# Patient Record
Sex: MALE | Race: BLACK OR AFRICAN AMERICAN | Employment: FULL TIME | URBAN - METROPOLITAN AREA
[De-identification: names, ages, dates, MRNs, and addresses within clinical notes are randomized per-mention and may not be internally consistent; named-entity substitution may affect disease eponyms.]

---

## 2020-03-06 ENCOUNTER — OFFICE VISIT (OUTPATIENT)
Dept: FAMILY MEDICINE CLINIC | Facility: CLINIC | Age: 65
End: 2020-03-06

## 2020-03-06 ENCOUNTER — LAB ENCOUNTER (OUTPATIENT)
Dept: LAB | Facility: REFERENCE LAB | Age: 65
End: 2020-03-06
Attending: FAMILY MEDICINE
Payer: COMMERCIAL

## 2020-03-06 VITALS
HEART RATE: 90 BPM | SYSTOLIC BLOOD PRESSURE: 128 MMHG | OXYGEN SATURATION: 98 % | HEIGHT: 75 IN | DIASTOLIC BLOOD PRESSURE: 78 MMHG | TEMPERATURE: 98 F | BODY MASS INDEX: 31.71 KG/M2 | WEIGHT: 255 LBS

## 2020-03-06 DIAGNOSIS — E11.9 TYPE 2 DIABETES MELLITUS WITHOUT COMPLICATION, WITHOUT LONG-TERM CURRENT USE OF INSULIN (HCC): ICD-10-CM

## 2020-03-06 DIAGNOSIS — I10 ESSENTIAL HYPERTENSION: Primary | ICD-10-CM

## 2020-03-06 DIAGNOSIS — E78.5 DYSLIPIDEMIA: ICD-10-CM

## 2020-03-06 DIAGNOSIS — Z12.5 PROSTATE CANCER SCREENING: ICD-10-CM

## 2020-03-06 DIAGNOSIS — I51.9 DIASTOLIC DYSFUNCTION, LEFT VENTRICLE: ICD-10-CM

## 2020-03-06 DIAGNOSIS — I10 ESSENTIAL HYPERTENSION: ICD-10-CM

## 2020-03-06 DIAGNOSIS — C91.91 LYMPHOID LEUKEMIA IN REMISSION, UNSPECIFIED LYMPHOID LEUKEMIA TYPE (HCC): ICD-10-CM

## 2020-03-06 DIAGNOSIS — Z72.820 SLEEP DEPRIVATION: ICD-10-CM

## 2020-03-06 LAB
ALBUMIN SERPL-MCNC: 3.9 G/DL (ref 3.4–5)
ALBUMIN/GLOB SERPL: 1 {RATIO} (ref 1–2)
ALP LIVER SERPL-CCNC: 68 U/L (ref 45–117)
ALT SERPL-CCNC: 26 U/L (ref 16–61)
ANION GAP SERPL CALC-SCNC: 7 MMOL/L (ref 0–18)
AST SERPL-CCNC: 15 U/L (ref 15–37)
BILIRUB SERPL-MCNC: 0.4 MG/DL (ref 0.1–2)
BILIRUB UR QL: NEGATIVE
BUN BLD-MCNC: 17 MG/DL (ref 7–18)
BUN/CREAT SERPL: 17.7 (ref 10–20)
CALCIUM BLD-MCNC: 9.4 MG/DL (ref 8.5–10.1)
CHLORIDE SERPL-SCNC: 105 MMOL/L (ref 98–112)
CHOLEST SMN-MCNC: 145 MG/DL (ref ?–200)
CLARITY UR: CLEAR
CO2 SERPL-SCNC: 29 MMOL/L (ref 21–32)
COLOR UR: YELLOW
COMPLEXED PSA SERPL-MCNC: 0.16 NG/ML (ref ?–4)
CREAT BLD-MCNC: 0.96 MG/DL (ref 0.7–1.3)
CREAT UR-SCNC: 140 MG/DL
EST. AVERAGE GLUCOSE BLD GHB EST-MCNC: 166 MG/DL (ref 68–126)
GLOBULIN PLAS-MCNC: 4 G/DL (ref 2.8–4.4)
GLUCOSE BLD-MCNC: 92 MG/DL (ref 70–99)
GLUCOSE UR-MCNC: NEGATIVE MG/DL
HBA1C MFR BLD HPLC: 7.4 % (ref ?–5.7)
HDLC SERPL-MCNC: 52 MG/DL (ref 40–59)
HGB UR QL STRIP.AUTO: NEGATIVE
KETONES UR-MCNC: NEGATIVE MG/DL
LDLC SERPL CALC-MCNC: 78 MG/DL (ref ?–100)
LEUKOCYTE ESTERASE UR QL STRIP.AUTO: NEGATIVE
M PROTEIN MFR SERPL ELPH: 7.9 G/DL (ref 6.4–8.2)
MICROALBUMIN UR-MCNC: 0.76 MG/DL
MICROALBUMIN/CREAT 24H UR-RTO: 5.4 UG/MG (ref ?–30)
NITRITE UR QL STRIP.AUTO: NEGATIVE
NONHDLC SERPL-MCNC: 93 MG/DL (ref ?–130)
OSMOLALITY SERPL CALC.SUM OF ELEC: 293 MOSM/KG (ref 275–295)
PATIENT FASTING Y/N/NP: YES
PATIENT FASTING Y/N/NP: YES
PH UR: 6 [PH] (ref 5–8)
POTASSIUM SERPL-SCNC: 4 MMOL/L (ref 3.5–5.1)
PROT UR-MCNC: NEGATIVE MG/DL
SODIUM SERPL-SCNC: 141 MMOL/L (ref 136–145)
SP GR UR STRIP: 1.02 (ref 1–1.03)
TRIGL SERPL-MCNC: 74 MG/DL (ref 30–149)
UROBILINOGEN UR STRIP-ACNC: <2
VLDLC SERPL CALC-MCNC: 15 MG/DL (ref 0–30)

## 2020-03-06 PROCEDURE — 82043 UR ALBUMIN QUANTITATIVE: CPT

## 2020-03-06 PROCEDURE — 83036 HEMOGLOBIN GLYCOSYLATED A1C: CPT

## 2020-03-06 PROCEDURE — 36415 COLL VENOUS BLD VENIPUNCTURE: CPT

## 2020-03-06 PROCEDURE — 99387 INIT PM E/M NEW PAT 65+ YRS: CPT | Performed by: FAMILY MEDICINE

## 2020-03-06 PROCEDURE — 85060 BLOOD SMEAR INTERPRETATION: CPT

## 2020-03-06 PROCEDURE — 80061 LIPID PANEL: CPT

## 2020-03-06 PROCEDURE — 80053 COMPREHEN METABOLIC PANEL: CPT

## 2020-03-06 PROCEDURE — 85025 COMPLETE CBC W/AUTO DIFF WBC: CPT

## 2020-03-06 PROCEDURE — 82306 VITAMIN D 25 HYDROXY: CPT

## 2020-03-06 PROCEDURE — 82570 ASSAY OF URINE CREATININE: CPT

## 2020-03-06 PROCEDURE — 81003 URINALYSIS AUTO W/O SCOPE: CPT

## 2020-03-06 RX ORDER — ASPIRIN 81 MG/1
81 TABLET, CHEWABLE ORAL DAILY
COMMUNITY

## 2020-03-06 RX ORDER — LISINOPRIL 20 MG/1
20 TABLET ORAL DAILY
COMMUNITY
End: 2020-04-14

## 2020-03-06 RX ORDER — SIMVASTATIN 40 MG
40 TABLET ORAL NIGHTLY
COMMUNITY
End: 2020-04-14

## 2020-03-06 RX ORDER — ERGOCALCIFEROL 1.25 MG/1
50000 CAPSULE ORAL WEEKLY
COMMUNITY
End: 2020-04-17

## 2020-03-06 NOTE — PATIENT INSTRUCTIONS
Start to take sugar-free Metamucil, 1 teaspoon once daily with 12 to 16 ounces of water. Get exercise every day. This is very important with your diagnosis of hypertension and diabetes. Please start very slowly, about 5 minutes daily.   Then you can in

## 2020-03-06 NOTE — PROGRESS NOTES
HPI:    Patient ID: Nataliia Blair is a 72year old male who presents for:  Epigastric hernia repair  DM , last blod check Jan 2019 diagnosed at 39yo  Hypertension diagnosed at 40 yo  Right left back radiculopathy  Diagnosed with leukemia in remission.   Pt is w organizations: Not on file        Relationship status: Not on file      Intimate partner violence:        Fear of current or ex partner: Not on file        Emotionally abused: Not on file        Physically abused: Not on file        Forced sexual activity: Nonpainful, non-ballotable  Heart: PMI nondisplaced, regular rate and rhythm without murmur  Lungs clear to auscultation  Abdomen soft without visceromegaly, masses, tenderness  External genitalia: Penis uncircumcised, testes bilaterally descended without [facility])  Need to get medical records. Patient brought CBC that revealed a lymphocytosis. No bone marrow results seen  - CBC WITH DIFFERENTIAL WITH PLATELET; Future    6.  Diastolic dysfunction, left ventricle  Consider adding beta-blocker if patient needs a

## 2020-03-07 LAB
BASOPHILS # BLD AUTO: 0.03 X10(3) UL (ref 0–0.2)
BASOPHILS NFR BLD AUTO: 0.3 %
DEPRECATED RDW RBC AUTO: 47.3 FL (ref 35.1–46.3)
EOSINOPHIL # BLD AUTO: 0.08 X10(3) UL (ref 0–0.7)
EOSINOPHIL NFR BLD AUTO: 0.8 %
ERYTHROCYTE [DISTWIDTH] IN BLOOD BY AUTOMATED COUNT: 14 % (ref 11–15)
HCT VFR BLD AUTO: 41.5 % (ref 39–53)
HGB BLD-MCNC: 13.8 G/DL (ref 13–17.5)
IMM GRANULOCYTES # BLD AUTO: 0.02 X10(3) UL (ref 0–1)
IMM GRANULOCYTES NFR BLD: 0.2 %
LYMPHOCYTES # BLD AUTO: 6.84 X10(3) UL (ref 1–4)
LYMPHOCYTES NFR BLD AUTO: 65.5 %
MCH RBC QN AUTO: 30.5 PG (ref 26–34)
MCHC RBC AUTO-ENTMCNC: 33.3 G/DL (ref 31–37)
MCV RBC AUTO: 91.8 FL (ref 80–100)
MONOCYTES # BLD AUTO: 0.67 X10(3) UL (ref 0.1–1)
MONOCYTES NFR BLD AUTO: 6.4 %
NEUTROPHILS # BLD AUTO: 2.8 X10 (3) UL (ref 1.5–7.7)
NEUTROPHILS # BLD AUTO: 2.8 X10(3) UL (ref 1.5–7.7)
NEUTROPHILS NFR BLD AUTO: 26.8 %
PLATELET # BLD AUTO: 184 10(3)UL (ref 150–450)
RBC # BLD AUTO: 4.52 X10(6)UL (ref 3.8–5.8)
WBC # BLD AUTO: 10.4 X10(3) UL (ref 4–11)

## 2020-03-09 LAB — 25(OH)D3 SERPL-MCNC: 51.2 NG/ML (ref 30–100)

## 2020-03-15 ENCOUNTER — TELEPHONE (OUTPATIENT)
Dept: FAMILY MEDICINE CLINIC | Facility: CLINIC | Age: 65
End: 2020-03-15

## 2020-03-15 DIAGNOSIS — E11.9 TYPE 2 DIABETES MELLITUS WITHOUT COMPLICATION, WITHOUT LONG-TERM CURRENT USE OF INSULIN (HCC): Primary | ICD-10-CM

## 2020-03-15 NOTE — TELEPHONE ENCOUNTER
Phone call with patient  A1c is 7.4  Lipid, hitesh, vitamin D, urine for microalbumin, and PSA all normal.  We will increase metformin from 500 mg to 1000 mg daily. Will repeat blood in 6 months.   Patient expressed understanding  [address]

## 2020-04-14 RX ORDER — SIMVASTATIN 40 MG
40 TABLET ORAL NIGHTLY
Qty: 90 TABLET | Refills: 0 | Status: SHIPPED | OUTPATIENT
Start: 2020-04-14 | End: 2020-07-07

## 2020-04-14 RX ORDER — LISINOPRIL 20 MG/1
20 TABLET ORAL DAILY
Qty: 90 TABLET | Refills: 0 | Status: SHIPPED | OUTPATIENT
Start: 2020-04-14 | End: 2020-07-13

## 2020-04-14 NOTE — TELEPHONE ENCOUNTER
Pt is needing refills on     lisinopril 20 MG Oral Tab    simvastatin 40 MG Oral Tab      [id number] [place], [place] - [address] AT [facility], [phone], [phone]

## 2020-04-14 NOTE — TELEPHONE ENCOUNTER
Pt's wife notified that meds were refilled as requested. wife verbalized understanding and agrees with POC.

## 2020-04-17 ENCOUNTER — TELEPHONE (OUTPATIENT)
Dept: FAMILY MEDICINE CLINIC | Facility: CLINIC | Age: 65
End: 2020-04-17

## 2020-04-17 RX ORDER — ERGOCALCIFEROL 1.25 MG/1
50000 CAPSULE ORAL WEEKLY
Qty: 12 CAPSULE | Refills: 3 | Status: SHIPPED | OUTPATIENT
Start: 2020-04-17 | End: 2021-04-06

## 2020-04-17 NOTE — TELEPHONE ENCOUNTER
A refill request was received for:  Requested Prescriptions      No prescriptions requested or ordered in this encounter     Last refill date: unknown  Qty:   Last office visit: 03/06/2020  When is follow up due: 04/20/2020    Future Appointments   Date Ti

## 2020-04-20 ENCOUNTER — TELEMEDICINE (OUTPATIENT)
Dept: FAMILY MEDICINE CLINIC | Facility: CLINIC | Age: 65
End: 2020-04-20

## 2020-04-20 DIAGNOSIS — E78.5 DYSLIPIDEMIA: ICD-10-CM

## 2020-04-20 DIAGNOSIS — Z72.820 SLEEP DEPRIVATION: ICD-10-CM

## 2020-04-20 DIAGNOSIS — E11.9 TYPE 2 DIABETES MELLITUS WITHOUT COMPLICATION, WITHOUT LONG-TERM CURRENT USE OF INSULIN (HCC): Primary | ICD-10-CM

## 2020-04-20 DIAGNOSIS — I10 ESSENTIAL HYPERTENSION: ICD-10-CM

## 2020-04-20 PROCEDURE — 99213 OFFICE O/P EST LOW 20 MIN: CPT | Performed by: FAMILY MEDICINE

## 2020-04-20 NOTE — PROGRESS NOTES
This is a telemedicine visit with live, interactive video and audio. Patient understands and accepts financial responsibility for any deductible, co-insurance and/or co-pays associated with this service.     SUBJECTIVE  Follow-up of diabetes, hypertensi labored, patient without conversational dyspnea.   No cough,    ASSESSMENT & PLAN  Diagnoses and all orders for this visit:    Type 2 diabetes mellitus without complication, without long-term current use of insulin ([facility])  -     OPHTHALMOLOGY - INTERNAL    D

## 2020-07-07 RX ORDER — SIMVASTATIN 40 MG
TABLET ORAL
Qty: 90 TABLET | Refills: 0 | Status: SHIPPED | OUTPATIENT
Start: 2020-07-07 | End: 2020-09-29

## 2020-07-13 RX ORDER — LISINOPRIL 20 MG/1
TABLET ORAL
Qty: 90 TABLET | Refills: 0 | Status: SHIPPED | OUTPATIENT
Start: 2020-07-13 | End: 2020-11-17

## 2020-08-27 ENCOUNTER — PATIENT MESSAGE (OUTPATIENT)
Dept: FAMILY MEDICINE CLINIC | Facility: CLINIC | Age: 65
End: 2020-08-27

## 2020-08-27 NOTE — TELEPHONE ENCOUNTER
Called pt and schedule appointment with Dr. Sonali Newton at 0800 on 09/18/20. Pt verbalized understanding. Last visit 04/20/20 [facility]   Return in about 3 months (around 7/20/2020).

## 2020-08-27 NOTE — TELEPHONE ENCOUNTER
From: Juan Godinez  To: Ronal Lewis DO  Sent: 8/27/2020 9:59 AM CDT  Subject: Visit Follow-up Question    I need to schedule a test for blood in the stool which is over due.

## 2020-09-18 ENCOUNTER — LAB ENCOUNTER (OUTPATIENT)
Dept: LAB | Facility: REFERENCE LAB | Age: 65
End: 2020-09-18
Attending: FAMILY MEDICINE
Payer: COMMERCIAL

## 2020-09-18 ENCOUNTER — OFFICE VISIT (OUTPATIENT)
Dept: FAMILY MEDICINE CLINIC | Facility: CLINIC | Age: 65
End: 2020-09-18

## 2020-09-18 VITALS
DIASTOLIC BLOOD PRESSURE: 76 MMHG | TEMPERATURE: 98 F | HEART RATE: 94 BPM | BODY MASS INDEX: 31.83 KG/M2 | HEIGHT: 75 IN | OXYGEN SATURATION: 99 % | SYSTOLIC BLOOD PRESSURE: 138 MMHG | WEIGHT: 256 LBS

## 2020-09-18 DIAGNOSIS — Z23 NEED FOR VACCINATION: ICD-10-CM

## 2020-09-18 DIAGNOSIS — I10 ESSENTIAL HYPERTENSION: Primary | ICD-10-CM

## 2020-09-18 DIAGNOSIS — I10 ESSENTIAL HYPERTENSION: ICD-10-CM

## 2020-09-18 DIAGNOSIS — R94.31 ABNORMAL EKG: ICD-10-CM

## 2020-09-18 DIAGNOSIS — E11.9 TYPE 2 DIABETES MELLITUS WITHOUT COMPLICATION, WITHOUT LONG-TERM CURRENT USE OF INSULIN (HCC): ICD-10-CM

## 2020-09-18 DIAGNOSIS — N52.9 ERECTILE DYSFUNCTION, UNSPECIFIED ERECTILE DYSFUNCTION TYPE: ICD-10-CM

## 2020-09-18 LAB
ALBUMIN SERPL-MCNC: 3.5 G/DL (ref 3.4–5)
ALBUMIN/GLOB SERPL: 0.8 {RATIO} (ref 1–2)
ALP LIVER SERPL-CCNC: 70 U/L (ref 45–117)
ALT SERPL-CCNC: 24 U/L (ref 16–61)
ANION GAP SERPL CALC-SCNC: 3 MMOL/L (ref 0–18)
AST SERPL-CCNC: 20 U/L (ref 15–37)
BILIRUB SERPL-MCNC: 0.3 MG/DL (ref 0.1–2)
BUN BLD-MCNC: 14 MG/DL (ref 7–18)
BUN/CREAT SERPL: 13.6 (ref 10–20)
CALCIUM BLD-MCNC: 8.9 MG/DL (ref 8.5–10.1)
CHLORIDE SERPL-SCNC: 107 MMOL/L (ref 98–112)
CHOLEST SMN-MCNC: 152 MG/DL (ref ?–200)
CO2 SERPL-SCNC: 28 MMOL/L (ref 21–32)
CREAT BLD-MCNC: 1.03 MG/DL (ref 0.7–1.3)
EST. AVERAGE GLUCOSE BLD GHB EST-MCNC: 160 MG/DL (ref 68–126)
GLOBULIN PLAS-MCNC: 4.2 G/DL (ref 2.8–4.4)
GLUCOSE BLD-MCNC: 116 MG/DL (ref 70–99)
HBA1C MFR BLD HPLC: 7.2 % (ref ?–5.7)
HDLC SERPL-MCNC: 56 MG/DL (ref 40–59)
LDLC SERPL CALC-MCNC: 74 MG/DL (ref ?–100)
M PROTEIN MFR SERPL ELPH: 7.7 G/DL (ref 6.4–8.2)
NONHDLC SERPL-MCNC: 96 MG/DL (ref ?–130)
OSMOLALITY SERPL CALC.SUM OF ELEC: 287 MOSM/KG (ref 275–295)
PATIENT FASTING Y/N/NP: YES
PATIENT FASTING Y/N/NP: YES
POTASSIUM SERPL-SCNC: 3.8 MMOL/L (ref 3.5–5.1)
SODIUM SERPL-SCNC: 138 MMOL/L (ref 136–145)
TRIGL SERPL-MCNC: 109 MG/DL (ref 30–149)
VLDLC SERPL CALC-MCNC: 22 MG/DL (ref 0–30)

## 2020-09-18 PROCEDURE — 93000 ELECTROCARDIOGRAM COMPLETE: CPT | Performed by: FAMILY MEDICINE

## 2020-09-18 PROCEDURE — 3008F BODY MASS INDEX DOCD: CPT | Performed by: FAMILY MEDICINE

## 2020-09-18 PROCEDURE — 80053 COMPREHEN METABOLIC PANEL: CPT

## 2020-09-18 PROCEDURE — 3075F SYST BP GE 130 - 139MM HG: CPT | Performed by: FAMILY MEDICINE

## 2020-09-18 PROCEDURE — 99214 OFFICE O/P EST MOD 30 MIN: CPT | Performed by: FAMILY MEDICINE

## 2020-09-18 PROCEDURE — 36415 COLL VENOUS BLD VENIPUNCTURE: CPT

## 2020-09-18 PROCEDURE — 3078F DIAST BP <80 MM HG: CPT | Performed by: FAMILY MEDICINE

## 2020-09-18 PROCEDURE — 80061 LIPID PANEL: CPT

## 2020-09-18 PROCEDURE — 90471 IMMUNIZATION ADMIN: CPT | Performed by: FAMILY MEDICINE

## 2020-09-18 PROCEDURE — 83036 HEMOGLOBIN GLYCOSYLATED A1C: CPT

## 2020-09-18 PROCEDURE — 90662 IIV NO PRSV INCREASED AG IM: CPT | Performed by: FAMILY MEDICINE

## 2020-09-18 RX ORDER — TADALAFIL 5 MG/1
5 TABLET ORAL
Qty: 18 TABLET | Refills: 3 | Status: SHIPPED | OUTPATIENT
Start: 2020-09-18 | End: 2020-12-07

## 2020-09-18 NOTE — PROGRESS NOTES
HPI:    Patient ID: Cj Moreno is a 72year old male who presents for His one complaint is that his ability to have a meaningful erection has worsened  He denies chest pain, chest tightness, shortness of breath, diaphoresis with exertion.   He is a [employer] violence:        Fear of current or ex partner: Not on file        Emotionally abused: Not on file        Physically abused: Not on file        Forced sexual activity: Not on file    Other Topics      Concerns:        Caffeine Concern: Not Asked        Exe anxiety  Hematology/Lymphatics: Negative for: bruising, lower extremity edema  Endocrine: Negative for: polyuria, polydypsia  Skin: Negative for: rash, blister,          /76   Pulse 94   Temp 98 °F (36.7 °C) (Oral)   Ht 75\"   Wt 256 lb (116.1 kg) total) by mouth daily as needed for Erectile Dysfunction. Dispense: 18 tablet;  Refill: 3       Meds This Visit:  Requested Prescriptions     Signed Prescriptions Disp Refills   • tadalafil 5 MG Oral Tab 18 tablet 3     Sig: Take 1 tablet (5 mg total) by m

## 2020-09-23 ENCOUNTER — TELEPHONE (OUTPATIENT)
Dept: FAMILY MEDICINE CLINIC | Facility: CLINIC | Age: 65
End: 2020-09-23

## 2020-09-29 RX ORDER — SIMVASTATIN 40 MG
TABLET ORAL
Qty: 90 TABLET | Refills: 0 | Status: SHIPPED | OUTPATIENT
Start: 2020-09-29 | End: 2020-12-30

## 2020-09-29 NOTE — TELEPHONE ENCOUNTER
A refill request was received for:  Requested Prescriptions     Pending Prescriptions Disp Refills   • SIMVASTATIN 40 MG Oral Tab [Pharmacy Med Name: SIMVASTATIN 40 MG TABLET] 90 tablet 0     Sig: TAKE 1 TABLET BY MOUTH EVERY DAY AT NIGHT     Last refill d

## 2020-10-20 ENCOUNTER — TELEPHONE (OUTPATIENT)
Dept: CASE MANAGEMENT | Age: 65
End: 2020-10-20

## 2020-10-20 NOTE — TELEPHONE ENCOUNTER
Patient informed is due for DM eye exam, states has appointment for today and will have them forward the report to Dr. Kimberlee Camacho office.

## 2020-10-22 ENCOUNTER — TELEPHONE (OUTPATIENT)
Dept: FAMILY MEDICINE CLINIC | Facility: CLINIC | Age: 65
End: 2020-10-22

## 2020-11-17 RX ORDER — LISINOPRIL 20 MG/1
TABLET ORAL
Qty: 90 TABLET | Refills: 0 | Status: SHIPPED | OUTPATIENT
Start: 2020-11-17 | End: 2021-02-08

## 2020-12-07 RX ORDER — TADALAFIL 10 MG/1
10 TABLET ORAL
Qty: 18 TABLET | Refills: 3 | Status: SHIPPED | OUTPATIENT
Start: 2020-12-07

## 2020-12-08 ENCOUNTER — TELEPHONE (OUTPATIENT)
Dept: FAMILY MEDICINE CLINIC | Facility: CLINIC | Age: 65
End: 2020-12-08

## 2020-12-30 RX ORDER — SIMVASTATIN 40 MG
TABLET ORAL
Qty: 90 TABLET | Refills: 0 | Status: SHIPPED | OUTPATIENT
Start: 2020-12-30 | End: 2021-05-06

## 2021-01-23 ENCOUNTER — HOSPITAL ENCOUNTER (EMERGENCY)
Facility: HOSPITAL | Age: 66
Discharge: HOME | End: 2021-01-23
Attending: EMERGENCY MEDICINE
Payer: COMMERCIAL

## 2021-01-23 ENCOUNTER — APPOINTMENT (OUTPATIENT)
Dept: GENERAL RADIOLOGY | Facility: HOSPITAL | Age: 66
End: 2021-01-23
Attending: EMERGENCY MEDICINE
Payer: COMMERCIAL

## 2021-01-23 VITALS
TEMPERATURE: 98 F | HEIGHT: 75 IN | OXYGEN SATURATION: 95 % | DIASTOLIC BLOOD PRESSURE: 70 MMHG | WEIGHT: 255 LBS | HEART RATE: 88 BPM | SYSTOLIC BLOOD PRESSURE: 124 MMHG | BODY MASS INDEX: 31.71 KG/M2 | RESPIRATION RATE: 22 BRPM

## 2021-01-23 DIAGNOSIS — R07.9 CHEST PAIN OF UNCERTAIN ETIOLOGY: Primary | ICD-10-CM

## 2021-01-23 LAB
ALBUMIN SERPL-MCNC: 3.7 G/DL (ref 3.4–5)
ALP LIVER SERPL-CCNC: 72 U/L
ALT SERPL-CCNC: 21 U/L
ANION GAP SERPL CALC-SCNC: 4 MMOL/L (ref 0–18)
AST SERPL-CCNC: 13 U/L (ref 15–37)
BASOPHILS # BLD AUTO: 0.02 X10(3) UL (ref 0–0.2)
BASOPHILS NFR BLD AUTO: 0.2 %
BILIRUB DIRECT SERPL-MCNC: 0.1 MG/DL (ref 0–0.2)
BILIRUB SERPL-MCNC: 0.3 MG/DL (ref 0.1–2)
BUN BLD-MCNC: 15 MG/DL (ref 7–18)
BUN/CREAT SERPL: 14.4 (ref 10–20)
CALCIUM BLD-MCNC: 8.9 MG/DL (ref 8.5–10.1)
CHLORIDE SERPL-SCNC: 108 MMOL/L (ref 98–112)
CO2 SERPL-SCNC: 27 MMOL/L (ref 21–32)
CREAT BLD-MCNC: 1.04 MG/DL
D DIMER PPP FEU-MCNC: 0.28 UG/ML FEU (ref ?–0.65)
DEPRECATED RDW RBC AUTO: 47.1 FL (ref 35.1–46.3)
EOSINOPHIL # BLD AUTO: 0.04 X10(3) UL (ref 0–0.7)
EOSINOPHIL NFR BLD AUTO: 0.4 %
ERYTHROCYTE [DISTWIDTH] IN BLOOD BY AUTOMATED COUNT: 14.4 % (ref 11–15)
GLUCOSE BLD-MCNC: 119 MG/DL (ref 70–99)
HCT VFR BLD AUTO: 38.4 %
HGB BLD-MCNC: 13.1 G/DL
IMM GRANULOCYTES # BLD AUTO: 0.01 X10(3) UL (ref 0–1)
IMM GRANULOCYTES NFR BLD: 0.1 %
LIPASE SERPL-CCNC: 119 U/L (ref 73–393)
LYMPHOCYTES # BLD AUTO: 4.31 X10(3) UL (ref 1–4)
LYMPHOCYTES NFR BLD AUTO: 45.4 %
M PROTEIN MFR SERPL ELPH: 7.6 G/DL (ref 6.4–8.2)
MCH RBC QN AUTO: 30.8 PG (ref 26–34)
MCHC RBC AUTO-ENTMCNC: 34.1 G/DL (ref 31–37)
MCV RBC AUTO: 90.4 FL
MONOCYTES # BLD AUTO: 0.56 X10(3) UL (ref 0.1–1)
MONOCYTES NFR BLD AUTO: 5.9 %
NEUTROPHILS # BLD AUTO: 4.56 X10 (3) UL (ref 1.5–7.7)
NEUTROPHILS # BLD AUTO: 4.56 X10(3) UL (ref 1.5–7.7)
NEUTROPHILS NFR BLD AUTO: 48 %
OSMOLALITY SERPL CALC.SUM OF ELEC: 290 MOSM/KG (ref 275–295)
PLATELET # BLD AUTO: 171 10(3)UL (ref 150–450)
POTASSIUM SERPL-SCNC: 3.8 MMOL/L (ref 3.5–5.1)
RBC # BLD AUTO: 4.25 X10(6)UL
SODIUM SERPL-SCNC: 139 MMOL/L (ref 136–145)
TROPONIN I SERPL-MCNC: <0.045 NG/ML (ref ?–0.04)
WBC # BLD AUTO: 9.5 X10(3) UL (ref 4–11)

## 2021-01-23 PROCEDURE — 85379 FIBRIN DEGRADATION QUANT: CPT | Performed by: EMERGENCY MEDICINE

## 2021-01-23 PROCEDURE — 93005 ELECTROCARDIOGRAM TRACING: CPT

## 2021-01-23 PROCEDURE — 84484 ASSAY OF TROPONIN QUANT: CPT | Performed by: EMERGENCY MEDICINE

## 2021-01-23 PROCEDURE — 80076 HEPATIC FUNCTION PANEL: CPT | Performed by: EMERGENCY MEDICINE

## 2021-01-23 PROCEDURE — 96374 THER/PROPH/DIAG INJ IV PUSH: CPT

## 2021-01-23 PROCEDURE — 80048 BASIC METABOLIC PNL TOTAL CA: CPT | Performed by: EMERGENCY MEDICINE

## 2021-01-23 PROCEDURE — 83690 ASSAY OF LIPASE: CPT | Performed by: EMERGENCY MEDICINE

## 2021-01-23 PROCEDURE — 93010 ELECTROCARDIOGRAM REPORT: CPT | Performed by: EMERGENCY MEDICINE

## 2021-01-23 PROCEDURE — 99284 EMERGENCY DEPT VISIT MOD MDM: CPT

## 2021-01-23 PROCEDURE — 71045 X-RAY EXAM CHEST 1 VIEW: CPT | Performed by: EMERGENCY MEDICINE

## 2021-01-23 PROCEDURE — 85025 COMPLETE CBC W/AUTO DIFF WBC: CPT | Performed by: EMERGENCY MEDICINE

## 2021-01-23 RX ORDER — METOPROLOL SUCCINATE 25 MG/1
25 TABLET, EXTENDED RELEASE ORAL DAILY
Qty: 14 TABLET | Refills: 0 | Status: SHIPPED | OUTPATIENT
Start: 2021-01-23 | End: 2021-02-06

## 2021-01-23 RX ORDER — METOPROLOL TARTRATE 5 MG/5ML
5 INJECTION INTRAVENOUS ONCE
Status: COMPLETED | OUTPATIENT
Start: 2021-01-23 | End: 2021-01-23

## 2021-01-23 RX ORDER — ASPIRIN 81 MG/1
162 TABLET, CHEWABLE ORAL ONCE
Status: COMPLETED | OUTPATIENT
Start: 2021-01-23 | End: 2021-01-23

## 2021-01-23 NOTE — ED PROVIDER NOTES
Patient Seen in: [facility] AND [facility] Department    History   Patient presents with:  Chest Pain Angina    Stated Complaint: chest pain x 3 days    HPI    72year old male presenting with chest pain. Pain began 3 days ago .  The patient describes t Relation Age of Onset   • Cancer Father    • Genetic Disease Mother    • Cancer Maternal Grandmother        Social History    Tobacco Use      Smoking status: Former Smoker        Packs/day: 0.75        Years: 20.00        Pack years: 13        Quit date: diagnosis to include Acute coronary syndrome vs. Acute pulmonary process including pneumothorax vs. Dissection vs.  pleurisy vs. PE vs. Pneumonia/effusion vs. GI related pathology such as GERD or gastritis, vs. Musculoskeletal        ED Course     Labs Rev (>65 2pt, 45-64 1 pt, Under 45 0 pt)    2  Risk Factors- ( DM,HTN,Chol, fhx CAD, BMI>30, hx CAD)  ( >3 or hx CAD 2pt, 1-2 risks 1pt, None 0pt)  2  Troponin- ( 3 times nl 2pt, 2 times nl 1pt, nl 0pt   0         TOTAL  5    SCORE 0-3: 2.5% MACE over next 6 w Prescribed:  Discharge Medication List as of 1/23/2021  4:01 PM    START taking these medications    Metoprolol Succinate ER 25 MG Oral Tablet 24 Hr  Take 1 tablet (25 mg total) by mouth daily for 14 days. , Normal, Disp-14 tablet, R-0

## 2021-01-25 NOTE — CM/SW NOTE
Kisha Zhao called back and spoke with patient and was able to schedule patient for [facility] tomorrow 1/26 @ 3349.

## 2021-01-25 NOTE — CM/SW NOTE
Milas Beams from [employer] called this RN - per Nina Beams he sees the order is in [facility] and he will call patient to schedule. Milas Beams will call ERCM back with date & time he was able to get patient scheduled.

## 2021-01-25 NOTE — CM/SW NOTE
Jw Friedman called ERCM back and states patient called him back and wanted to change his Priya Sanchez date from tomorrow to [facility] 1/27 at 0830 - so patient now coming on 1/27 @ 0830 for Priya Sanchez per Jw Friedman.

## 2021-01-27 ENCOUNTER — HOSPITAL ENCOUNTER (OUTPATIENT)
Dept: CV DIAGNOSTICS | Facility: HOSPITAL | Age: 66
Discharge: HOME | End: 2021-01-27
Attending: EMERGENCY MEDICINE
Payer: COMMERCIAL

## 2021-01-27 ENCOUNTER — HOSPITAL ENCOUNTER (OUTPATIENT)
Dept: NUCLEAR MEDICINE | Facility: HOSPITAL | Age: 66
Discharge: HOME | End: 2021-01-27
Attending: EMERGENCY MEDICINE
Payer: COMMERCIAL

## 2021-01-27 DIAGNOSIS — R07.9 CHEST PAIN OF UNCERTAIN ETIOLOGY: ICD-10-CM

## 2021-01-27 PROCEDURE — 93016 CV STRESS TEST SUPVJ ONLY: CPT | Performed by: EMERGENCY MEDICINE

## 2021-01-27 PROCEDURE — 93018 CV STRESS TEST I&R ONLY: CPT | Performed by: EMERGENCY MEDICINE

## 2021-01-27 PROCEDURE — 93017 CV STRESS TEST TRACING ONLY: CPT | Performed by: EMERGENCY MEDICINE

## 2021-01-27 PROCEDURE — 78452 HT MUSCLE IMAGE SPECT MULT: CPT | Performed by: EMERGENCY MEDICINE

## 2021-01-29 DIAGNOSIS — Z23 NEED FOR VACCINATION: ICD-10-CM

## 2021-02-05 ENCOUNTER — IMMUNIZATION (OUTPATIENT)
Dept: LAB | Facility: HOSPITAL | Age: 66
End: 2021-02-05
Attending: HOSPITALIST
Payer: COMMERCIAL

## 2021-02-05 DIAGNOSIS — Z23 NEED FOR VACCINATION: Primary | ICD-10-CM

## 2021-02-05 PROCEDURE — 0011A SARSCOV2 VAC 100MCG/0.5ML IM: CPT

## 2021-02-08 ENCOUNTER — TELEPHONE (OUTPATIENT)
Dept: FAMILY MEDICINE CLINIC | Facility: CLINIC | Age: 66
End: 2021-02-08

## 2021-02-08 RX ORDER — LISINOPRIL 20 MG/1
20 TABLET ORAL DAILY
Qty: 30 TABLET | Refills: 0 | Status: SHIPPED | OUTPATIENT
Start: 2021-02-08 | End: 2021-03-07

## 2021-02-08 NOTE — TELEPHONE ENCOUNTER
Current Outpatient Medications   Medication Sig Dispense Refill   • LISINOPRIL 20 MG Oral Tab TAKE 1 TABLET BY MOUTH EVERY DAY 90 tablet 0

## 2021-02-08 NOTE — TELEPHONE ENCOUNTER
Medication requested   • LISINOPRIL 20 MG Oral Tab TAKE 1 TABLET BY MOUTH EVERY DAY 90 tablet 0         Pt last appt/annual: 9/18/20    RTO (return to office) recommendation:3 months Dec 2020    Last refill: 11/14/20    Next appt scheduled: none       Pt r

## 2021-02-18 ENCOUNTER — OFFICE VISIT (OUTPATIENT)
Dept: FAMILY MEDICINE CLINIC | Facility: CLINIC | Age: 66
End: 2021-02-18

## 2021-02-18 VITALS
HEART RATE: 80 BPM | HEIGHT: 75 IN | BODY MASS INDEX: 31.58 KG/M2 | SYSTOLIC BLOOD PRESSURE: 158 MMHG | OXYGEN SATURATION: 98 % | WEIGHT: 254 LBS | DIASTOLIC BLOOD PRESSURE: 78 MMHG

## 2021-02-18 DIAGNOSIS — M25.512 CHRONIC PAIN OF BOTH SHOULDERS: ICD-10-CM

## 2021-02-18 DIAGNOSIS — M25.511 CHRONIC PAIN OF BOTH SHOULDERS: ICD-10-CM

## 2021-02-18 DIAGNOSIS — I10 ESSENTIAL HYPERTENSION: ICD-10-CM

## 2021-02-18 DIAGNOSIS — R07.89 ATYPICAL CHEST PAIN: Primary | ICD-10-CM

## 2021-02-18 DIAGNOSIS — G89.29 CHRONIC PAIN OF BOTH SHOULDERS: ICD-10-CM

## 2021-02-18 PROCEDURE — 3078F DIAST BP <80 MM HG: CPT | Performed by: FAMILY MEDICINE

## 2021-02-18 PROCEDURE — 3077F SYST BP >= 140 MM HG: CPT | Performed by: FAMILY MEDICINE

## 2021-02-18 PROCEDURE — 3008F BODY MASS INDEX DOCD: CPT | Performed by: FAMILY MEDICINE

## 2021-02-18 PROCEDURE — 99214 OFFICE O/P EST MOD 30 MIN: CPT | Performed by: FAMILY MEDICINE

## 2021-02-18 PROCEDURE — 1111F DSCHRG MED/CURRENT MED MERGE: CPT | Performed by: FAMILY MEDICINE

## 2021-02-18 RX ORDER — HYDROCORTISONE 25 MG/G
1 CREAM TOPICAL 2 TIMES DAILY
COMMUNITY
Start: 2021-02-06 | End: 2021-04-06

## 2021-02-18 NOTE — PROGRESS NOTES
HPI:    Patient ID: Jayson Vasquez is a 77year old male who presents for ED f/u for chest pain, and shoulder pain. HPI  Feels pretty good. Has not had any pain since ED. Works as a deacon, fine arts director, and other roles.  Coming up on tax season, a negative. /78   Pulse 80   Ht 6' 3\" (1.905 m)   Wt 254 lb (115.2 kg)   SpO2 98%   BMI 31.75 kg/m²     PHYSICAL EXAM:   Physical Exam    Constitutional: He is obese. He appears well-developed and well-nourished. No distress.    HENT:   Head: cardiac w/u at this time. 2. Essential hypertension  -Elevated today. -BP normal on 1/23/21.   -Resume home monitoring.  -Continue lisinopril 20mg daily. -RTC in 2 weeks to review BP log.   -Had sleep study many years ago and normal per patient.

## 2021-03-02 ENCOUNTER — OFFICE VISIT (OUTPATIENT)
Dept: FAMILY MEDICINE CLINIC | Facility: CLINIC | Age: 66
End: 2021-03-02

## 2021-03-02 VITALS
DIASTOLIC BLOOD PRESSURE: 78 MMHG | SYSTOLIC BLOOD PRESSURE: 134 MMHG | HEART RATE: 81 BPM | BODY MASS INDEX: 31.46 KG/M2 | HEIGHT: 75 IN | WEIGHT: 253 LBS | OXYGEN SATURATION: 98 %

## 2021-03-02 DIAGNOSIS — G89.29 CHRONIC LEFT SHOULDER PAIN: ICD-10-CM

## 2021-03-02 DIAGNOSIS — G89.29 CHRONIC PAIN OF BOTH SHOULDERS: ICD-10-CM

## 2021-03-02 DIAGNOSIS — M25.512 CHRONIC LEFT SHOULDER PAIN: ICD-10-CM

## 2021-03-02 DIAGNOSIS — M25.511 CHRONIC PAIN OF BOTH SHOULDERS: ICD-10-CM

## 2021-03-02 DIAGNOSIS — M25.512 CHRONIC PAIN OF BOTH SHOULDERS: ICD-10-CM

## 2021-03-02 DIAGNOSIS — I10 ESSENTIAL HYPERTENSION: Primary | ICD-10-CM

## 2021-03-02 PROCEDURE — 3008F BODY MASS INDEX DOCD: CPT | Performed by: FAMILY MEDICINE

## 2021-03-02 PROCEDURE — 99214 OFFICE O/P EST MOD 30 MIN: CPT | Performed by: FAMILY MEDICINE

## 2021-03-02 PROCEDURE — 3075F SYST BP GE 130 - 139MM HG: CPT | Performed by: FAMILY MEDICINE

## 2021-03-02 PROCEDURE — 20610 DRAIN/INJ JOINT/BURSA W/O US: CPT | Performed by: FAMILY MEDICINE

## 2021-03-02 PROCEDURE — 3078F DIAST BP <80 MM HG: CPT | Performed by: FAMILY MEDICINE

## 2021-03-02 RX ORDER — TRIAMCINOLONE ACETONIDE 40 MG/ML
40 INJECTION, SUSPENSION INTRA-ARTICULAR; INTRAMUSCULAR ONCE
Status: COMPLETED | OUTPATIENT
Start: 2021-03-02 | End: 2021-03-02

## 2021-03-02 RX ADMIN — TRIAMCINOLONE ACETONIDE 40 MG: 40 INJECTION, SUSPENSION INTRA-ARTICULAR; INTRAMUSCULAR at 11:36:00

## 2021-03-02 NOTE — PROGRESS NOTES
HPI:    Patient ID: Vipul Cardona is a 77year old male who presents for HTN & shoulder pain f/u. HPI  HTN:  Continues to take lisinopril 20mg daily as prescribed. Home BP usually 150-160s/50-70. Home BP cuff is old.  BP has always been in that range w and atraumatic.    Right Ear: Tympanic membrane, external ear and ear canal normal.   Left Ear: Tympanic membrane, external ear and ear canal normal.   Nose: Nose normal.   Mouth/Throat: Oropharynx is clear and moist. No oropharyngeal exudate or pharynx blayne well.    Plan:  Appropriate post injection instructions given         ASSESSMENT/PLAN:   Essential hypertension  (primary encounter diagnosis)  Chronic left shoulder pain  Chronic pain of both shoulders    1.  Essential hypertension  -Home BP cuff likely in

## 2021-03-04 ENCOUNTER — TELEPHONE (OUTPATIENT)
Dept: FAMILY MEDICINE CLINIC | Facility: CLINIC | Age: 66
End: 2021-03-04

## 2021-03-04 NOTE — TELEPHONE ENCOUNTER
LM with patient that Lilianety Budswetha has been changed to [facility]. Gave phone [phone]   Faxed referral to [phone].

## 2021-03-04 NOTE — TELEPHONE ENCOUNTER
LM to inform patient that his physical therapy referral to [facility] as been approved for 8 visits.

## 2021-03-05 ENCOUNTER — IMMUNIZATION (OUTPATIENT)
Dept: LAB | Facility: HOSPITAL | Age: 66
End: 2021-03-05
Attending: EMERGENCY MEDICINE
Payer: COMMERCIAL

## 2021-03-05 DIAGNOSIS — Z23 NEED FOR VACCINATION: Primary | ICD-10-CM

## 2021-03-05 PROCEDURE — 0012A SARSCOV2 VAC 100MCG/0.5ML IM: CPT

## 2021-03-07 RX ORDER — LISINOPRIL 20 MG/1
20 TABLET ORAL DAILY
Qty: 90 TABLET | Refills: 1 | Status: SHIPPED | OUTPATIENT
Start: 2021-03-07 | End: 2021-05-25

## 2021-04-01 ENCOUNTER — TELEPHONE (OUTPATIENT)
Dept: FAMILY MEDICINE CLINIC | Facility: CLINIC | Age: 66
End: 2021-04-01

## 2021-04-01 DIAGNOSIS — E55.9 VITAMIN D DEFICIENCY: ICD-10-CM

## 2021-04-01 DIAGNOSIS — E11.9 TYPE 2 DIABETES MELLITUS WITHOUT COMPLICATION, WITHOUT LONG-TERM CURRENT USE OF INSULIN (HCC): Primary | ICD-10-CM

## 2021-04-01 NOTE — TELEPHONE ENCOUNTER
Gavin Sheppard, DO  Emmg 10 Dr. Rose Evans 6 minutes ago (10:20 AM)     Upon review of labs, pt due for A1c, urine test, and vitamin D. Labs ordered. Pt should have labs drawn and schedule f/u appt with me 4-5 days afterwards.  Does not have to be fasting

## 2021-04-01 NOTE — TELEPHONE ENCOUNTER
Pt needs a refill on Metformin HCI 1000 MG. Pt did mention that Dr. Karmen Taylor was his former PCP and now he is with Dr. Libia Pineda. Pt also mentioned that he has a new Medicare card but I was unsure if that effects his approval for meds.

## 2021-04-05 ENCOUNTER — LAB ENCOUNTER (OUTPATIENT)
Dept: LAB | Age: 66
End: 2021-04-05
Attending: FAMILY MEDICINE
Payer: MEDICARE

## 2021-04-05 PROCEDURE — 3044F HG A1C LEVEL LT 7.0%: CPT | Performed by: FAMILY MEDICINE

## 2021-04-05 PROCEDURE — 82570 ASSAY OF URINE CREATININE: CPT | Performed by: FAMILY MEDICINE

## 2021-04-05 PROCEDURE — 83036 HEMOGLOBIN GLYCOSYLATED A1C: CPT | Performed by: FAMILY MEDICINE

## 2021-04-05 PROCEDURE — 82043 UR ALBUMIN QUANTITATIVE: CPT | Performed by: FAMILY MEDICINE

## 2021-04-05 PROCEDURE — 82306 VITAMIN D 25 HYDROXY: CPT | Performed by: FAMILY MEDICINE

## 2021-04-05 PROCEDURE — 3061F NEG MICROALBUMINURIA REV: CPT | Performed by: FAMILY MEDICINE

## 2021-04-05 PROCEDURE — 36415 COLL VENOUS BLD VENIPUNCTURE: CPT | Performed by: FAMILY MEDICINE

## 2021-04-06 ENCOUNTER — OFFICE VISIT (OUTPATIENT)
Dept: FAMILY MEDICINE CLINIC | Facility: CLINIC | Age: 66
End: 2021-04-06
Payer: COMMERCIAL

## 2021-04-06 VITALS
HEART RATE: 80 BPM | DIASTOLIC BLOOD PRESSURE: 88 MMHG | BODY MASS INDEX: 31.08 KG/M2 | SYSTOLIC BLOOD PRESSURE: 136 MMHG | OXYGEN SATURATION: 98 % | HEIGHT: 75 IN | WEIGHT: 250 LBS

## 2021-04-06 DIAGNOSIS — E55.9 VITAMIN D DEFICIENCY: ICD-10-CM

## 2021-04-06 DIAGNOSIS — Z00.00 ENCOUNTER FOR ANNUAL HEALTH EXAMINATION: Primary | ICD-10-CM

## 2021-04-06 DIAGNOSIS — Z12.5 PROSTATE CANCER SCREENING: ICD-10-CM

## 2021-04-06 DIAGNOSIS — E78.5 DYSLIPIDEMIA: ICD-10-CM

## 2021-04-06 DIAGNOSIS — Z23 NEED FOR VACCINATION: ICD-10-CM

## 2021-04-06 DIAGNOSIS — Z85.6: ICD-10-CM

## 2021-04-06 DIAGNOSIS — E11.9 TYPE 2 DIABETES MELLITUS WITHOUT COMPLICATION, WITHOUT LONG-TERM CURRENT USE OF INSULIN (HCC): ICD-10-CM

## 2021-04-06 DIAGNOSIS — Z13.6 ENCOUNTER FOR ABDOMINAL AORTIC ANEURYSM (AAA) SCREENING: ICD-10-CM

## 2021-04-06 DIAGNOSIS — N52.9 ERECTILE DYSFUNCTION, UNSPECIFIED ERECTILE DYSFUNCTION TYPE: ICD-10-CM

## 2021-04-06 DIAGNOSIS — I10 ESSENTIAL HYPERTENSION: ICD-10-CM

## 2021-04-06 DIAGNOSIS — Z87.891 FORMER SMOKER: ICD-10-CM

## 2021-04-06 PROCEDURE — G0439 PPPS, SUBSEQ VISIT: HCPCS | Performed by: FAMILY MEDICINE

## 2021-04-06 PROCEDURE — 99397 PER PM REEVAL EST PAT 65+ YR: CPT | Performed by: FAMILY MEDICINE

## 2021-04-06 PROCEDURE — 3008F BODY MASS INDEX DOCD: CPT | Performed by: FAMILY MEDICINE

## 2021-04-06 PROCEDURE — 3079F DIAST BP 80-89 MM HG: CPT | Performed by: FAMILY MEDICINE

## 2021-04-06 PROCEDURE — G0009 ADMIN PNEUMOCOCCAL VACCINE: HCPCS | Performed by: FAMILY MEDICINE

## 2021-04-06 PROCEDURE — 90670 PCV13 VACCINE IM: CPT | Performed by: FAMILY MEDICINE

## 2021-04-06 PROCEDURE — 3075F SYST BP GE 130 - 139MM HG: CPT | Performed by: FAMILY MEDICINE

## 2021-04-06 PROCEDURE — 96160 PT-FOCUSED HLTH RISK ASSMT: CPT | Performed by: FAMILY MEDICINE

## 2021-04-06 RX ORDER — TADALAFIL 5 MG/1
5 TABLET ORAL
COMMUNITY
Start: 2021-03-07 | End: 2021-04-06

## 2021-04-06 RX ORDER — ERGOCALCIFEROL 1.25 MG/1
50000 CAPSULE ORAL WEEKLY
Qty: 12 CAPSULE | Refills: 3 | Status: SHIPPED | OUTPATIENT
Start: 2021-04-06 | End: 2021-05-25

## 2021-04-06 NOTE — PATIENT INSTRUCTIONS
Baljit Milner's SCREENING SCHEDULE   Tests on this list are recommended by your physician but may not be covered, or covered at this frequency, by your insurer. Please check with your insurance carrier before scheduling to verify coverage.     PREVENTATIVE years- more often if abnormal Colonoscopy Never done Update Health Maintenance if applicable    Flex Sigmoidoscopy Screen  Covered every 5 years No results found for this or any previous visit. No flowsheet data found.      Fecal Occult Blood   Covered Geraldine covered with your prescription benefits, but Medicare does not cover unless Medically needed    Zoster (Not covered by Medicare Part B) No orders found for this or any previous visit.  This may be covered with your pharmacy  prescription benefits     Recomm

## 2021-04-06 NOTE — PROGRESS NOTES
HPI:   Laurel Lynn is a 77year old male who presents for a Medicare Initial Preventative Physical Exam (Welcome to Medicare- < 12 months on Medicare). HTN: Taking lisinopril 20mg daily w/o issues. Going to get a new BP cuff.      HLD: Taking simvastat Smoker        Packs/day: 0.75        Years: 20.00        Pack years: 13        Quit date: 2000        Years since quittin.2      Smokeless tobacco: Never Used         CAGE screening score of 0 on 2021, showing low risk of alcohol abuse. Cream, Place 1 Application rectally 2 (two) times daily. MEDICAL INFORMATION:   He  has a past medical history of Essential hypertension and Type 1 diabetes mellitus ([address]     He  has a past surgical history that includes hernia surgery and knee arth without obvious abnormality, atraumatic   Eyes:  PERRL, conjunctiva/corneas clear, EOM's intact, both eyes   Ears:  Normal TM's and external ear canals, both ears   Nose: Nares normal, septum midline, mucosa normal, no drainage or sinus tenderness   Throat screening    Encounter for abdominal aortic aneurysm (AAA) screening  -     US ABDOMINAL AORTIC ANEURYSM SCREENING (CPT=76706);  Future    Need for vaccination  -     IMMUNIZATION ADMINISTRATION  -     PNEUMOCOCCAL VACC, 13 JORDAN IM    Former smoker    Anthony Duty Procedure External Lab or Procedure   Diabetes Screening      HbgA1C   Annually HgbA1C (%)   Date Value   04/05/2021 6.8 (H)       No flowsheet data found.     Fasting Blood Sugar (FSB)Annually Glucose (mg/dL)   Date Value   01/23/2021 119 (H)       Cardiov prescription benefits, but Medicare does not cover unless Medically needed    Zoster   Not covered by Medicare Part B No vaccine history found This may be covered with your pharmacy  prescription benefits      [facility] Internal Lab or Pr

## 2021-04-21 ENCOUNTER — HOSPITAL ENCOUNTER (OUTPATIENT)
Dept: ULTRASOUND IMAGING | Age: 66
Discharge: HOME | End: 2021-04-21
Attending: FAMILY MEDICINE
Payer: MEDICARE

## 2021-04-21 DIAGNOSIS — Z13.6 ENCOUNTER FOR ABDOMINAL AORTIC ANEURYSM (AAA) SCREENING: ICD-10-CM

## 2021-04-21 PROCEDURE — 76706 US ABDL AORTA SCREEN AAA: CPT | Performed by: FAMILY MEDICINE

## 2021-05-06 RX ORDER — SIMVASTATIN 40 MG
40 TABLET ORAL NIGHTLY
Qty: 90 TABLET | Refills: 1 | Status: SHIPPED | OUTPATIENT
Start: 2021-05-06 | End: 2021-08-26

## 2021-05-25 RX ORDER — ERGOCALCIFEROL 1.25 MG/1
50000 CAPSULE ORAL WEEKLY
Qty: 12 CAPSULE | Refills: 0 | Status: SHIPPED | OUTPATIENT
Start: 2021-05-25 | End: 2021-08-02

## 2021-05-25 RX ORDER — LISINOPRIL 20 MG/1
20 TABLET ORAL DAILY
Qty: 90 TABLET | Refills: 1 | Status: SHIPPED | OUTPATIENT
Start: 2021-05-25 | End: 2021-10-22

## 2021-08-02 RX ORDER — ERGOCALCIFEROL 1.25 MG/1
CAPSULE ORAL
Qty: 12 CAPSULE | Refills: 3 | Status: SHIPPED | OUTPATIENT
Start: 2021-08-02

## 2021-08-02 NOTE — TELEPHONE ENCOUNTER
A refill request was received for:  Requested Prescriptions     Pending Prescriptions Disp Refills   • ERGOCALCIFEROL 1.25 MG (50000 UT) Oral Cap [Pharmacy Med Name: Vitamin D (Ergocalciferol) 1.25 MG (50000 UT) Oral Capsule] 12 capsule 3     Sig: TAKE 1 C

## 2021-08-26 RX ORDER — SIMVASTATIN 40 MG
TABLET ORAL
Qty: 90 TABLET | Refills: 3 | Status: SHIPPED | OUTPATIENT
Start: 2021-08-26

## 2021-10-22 RX ORDER — LISINOPRIL 20 MG/1
TABLET ORAL
Qty: 90 TABLET | Refills: 3 | Status: SHIPPED | OUTPATIENT
Start: 2021-10-22

## 2021-10-22 NOTE — TELEPHONE ENCOUNTER
A refill request was received for:  Requested Prescriptions     Pending Prescriptions Disp Refills   • METFORMIN HCL 1000 MG Oral Tab [Pharmacy Med Name: metFORMIN HCl 1000 MG Oral Tablet] 90 tablet 3     Sig: TAKE 1 TABLET BY MOUTH  DAILY WITH BREAKFAST

## 2021-11-10 ENCOUNTER — IMMUNIZATION (OUTPATIENT)
Dept: LAB | Facility: HOSPITAL | Age: 66
End: 2021-11-10
Attending: EMERGENCY MEDICINE
Payer: MEDICARE

## 2021-11-10 DIAGNOSIS — Z23 NEED FOR VACCINATION: Primary | ICD-10-CM

## 2021-11-10 PROCEDURE — 0064A SARSCOV2 VAC 50MCG/0.25ML IM: CPT

## 2021-12-24 ENCOUNTER — LAB ENCOUNTER (OUTPATIENT)
Dept: LAB | Facility: REFERENCE LAB | Age: 66
End: 2021-12-24
Attending: FAMILY MEDICINE
Payer: MEDICARE

## 2021-12-24 ENCOUNTER — OFFICE VISIT (OUTPATIENT)
Dept: FAMILY MEDICINE CLINIC | Facility: CLINIC | Age: 66
End: 2021-12-24
Payer: COMMERCIAL

## 2021-12-24 VITALS
WEIGHT: 240.38 LBS | DIASTOLIC BLOOD PRESSURE: 72 MMHG | HEIGHT: 75 IN | BODY MASS INDEX: 29.89 KG/M2 | SYSTOLIC BLOOD PRESSURE: 130 MMHG | HEART RATE: 98 BPM | OXYGEN SATURATION: 99 %

## 2021-12-24 DIAGNOSIS — Z12.5 PROSTATE CANCER SCREENING: ICD-10-CM

## 2021-12-24 DIAGNOSIS — I10 ESSENTIAL HYPERTENSION: ICD-10-CM

## 2021-12-24 DIAGNOSIS — K59.00 CONSTIPATION, UNSPECIFIED CONSTIPATION TYPE: ICD-10-CM

## 2021-12-24 DIAGNOSIS — L29.9 PRURITIC DERMATITIS: ICD-10-CM

## 2021-12-24 DIAGNOSIS — E11.9 TYPE 2 DIABETES MELLITUS WITHOUT COMPLICATION, WITHOUT LONG-TERM CURRENT USE OF INSULIN (HCC): Primary | ICD-10-CM

## 2021-12-24 PROCEDURE — 3008F BODY MASS INDEX DOCD: CPT | Performed by: FAMILY MEDICINE

## 2021-12-24 PROCEDURE — 80053 COMPREHEN METABOLIC PANEL: CPT | Performed by: FAMILY MEDICINE

## 2021-12-24 PROCEDURE — 36415 COLL VENOUS BLD VENIPUNCTURE: CPT

## 2021-12-24 PROCEDURE — 99214 OFFICE O/P EST MOD 30 MIN: CPT | Performed by: FAMILY MEDICINE

## 2021-12-24 PROCEDURE — 3078F DIAST BP <80 MM HG: CPT | Performed by: FAMILY MEDICINE

## 2021-12-24 PROCEDURE — 83036 HEMOGLOBIN GLYCOSYLATED A1C: CPT | Performed by: FAMILY MEDICINE

## 2021-12-24 PROCEDURE — 3075F SYST BP GE 130 - 139MM HG: CPT | Performed by: FAMILY MEDICINE

## 2021-12-24 RX ORDER — TRIAMCINOLONE ACETONIDE 0.25 MG/G
1 OINTMENT TOPICAL 2 TIMES DAILY
Qty: 80 G | Refills: 1 | Status: SHIPPED | OUTPATIENT
Start: 2021-12-24 | End: 2022-01-07

## 2021-12-24 RX ORDER — TRIAMCINOLONE ACETONIDE 0.25 MG/G
1 OINTMENT TOPICAL 2 TIMES DAILY
Qty: 80 G | Refills: 1 | Status: SHIPPED | OUTPATIENT
Start: 2021-12-24 | End: 2021-12-24

## 2021-12-24 NOTE — PROGRESS NOTES
HPI:    Patient ID: Yris Figueroa is a 77year old male who presents for DM & HTN f/u, and itchy feet. HPI  [employer] and has been going 2-3x/wk for past 1.5 months. Retired officially as of end of November. Diet has improved.    Has been c He is not ill-appearing, toxic-appearing or diaphoretic. HENT:      Head: Normocephalic and atraumatic. Eyes:      Extraocular Movements: Extraocular movements intact.       Conjunctiva/sclera: Conjunctivae normal.   Cardiovascular:      Rate and Rhythm Constipation  -Discussed management in detail.  -Add daily metamucil. Continue increased water intake & regular exercise.      Meds This Visit:  Requested Prescriptions     Signed Prescriptions Disp Refills   • triamcinolone 0.025 % External Ointment 80 g 1

## 2022-03-28 RX ORDER — ERGOCALCIFEROL 1.25 MG/1
CAPSULE ORAL
Qty: 13 CAPSULE | Refills: 3 | Status: SHIPPED | OUTPATIENT
Start: 2022-03-28

## 2022-03-28 NOTE — TELEPHONE ENCOUNTER
Please review. Protocol failed / No protocol.   Requested Prescriptions   Pending Prescriptions Disp Refills    ERGOCALCIFEROL 1.25 MG (22380 UT) Oral Cap [Pharmacy Med Name: Vitamin D (Ergocalciferol) 1.25 MG (17017 UT) Oral Capsule] 13 capsule 3     Sig: TAKE 1 CAPSULE BY MOUTH  ONCE WEEKLY        There is no refill protocol information for this order          Recent Outpatient Visits              3 months ago Type 2 diabetes mellitus without complication, without long-term current use of insulin [facility])    [employer], [address] [place] [address], [address] Ansley Cord, DO    Office Visit    11 months ago Encounter for annual health examination    [address] Isma Duke, DO    Office Visit    1 year ago Essential hypertension    [employer], [facility] [address], Cooper Kevin, DO    Office Visit    1 year ago Atypical chest pain    [address] [place] [address] Cord, DO    Office Visit    1 year ago Essential hypertension    [employer], [address], Magdaleno BLANC, DO    Office Visit          Future Appointments         Provider Department Appt Notes    In 1 week Isma Cord, [address] [place] [address] What shots do I need

## 2022-04-07 ENCOUNTER — OFFICE VISIT (OUTPATIENT)
Dept: FAMILY MEDICINE CLINIC | Facility: CLINIC | Age: 67
End: 2022-04-07
Payer: COMMERCIAL

## 2022-04-07 ENCOUNTER — LAB ENCOUNTER (OUTPATIENT)
Dept: LAB | Facility: REFERENCE LAB | Age: 67
End: 2022-04-07
Attending: FAMILY MEDICINE
Payer: MEDICARE

## 2022-04-07 VITALS
HEIGHT: 75 IN | DIASTOLIC BLOOD PRESSURE: 82 MMHG | WEIGHT: 249 LBS | SYSTOLIC BLOOD PRESSURE: 146 MMHG | OXYGEN SATURATION: 98 % | HEART RATE: 72 BPM | BODY MASS INDEX: 30.96 KG/M2

## 2022-04-07 DIAGNOSIS — Z23 NEED FOR VACCINATION: ICD-10-CM

## 2022-04-07 DIAGNOSIS — E78.5 DYSLIPIDEMIA: ICD-10-CM

## 2022-04-07 DIAGNOSIS — Z11.59 ENCOUNTER FOR HEPATITIS C SCREENING TEST FOR LOW RISK PATIENT: ICD-10-CM

## 2022-04-07 DIAGNOSIS — E55.9 VITAMIN D DEFICIENCY: ICD-10-CM

## 2022-04-07 DIAGNOSIS — Z12.11 COLON CANCER SCREENING: ICD-10-CM

## 2022-04-07 DIAGNOSIS — Z87.891 FORMER SMOKER: ICD-10-CM

## 2022-04-07 DIAGNOSIS — E11.9 TYPE 2 DIABETES MELLITUS WITHOUT COMPLICATION, WITHOUT LONG-TERM CURRENT USE OF INSULIN (HCC): ICD-10-CM

## 2022-04-07 DIAGNOSIS — Z00.00 ENCOUNTER FOR ANNUAL HEALTH EXAMINATION: Primary | ICD-10-CM

## 2022-04-07 DIAGNOSIS — I10 ESSENTIAL HYPERTENSION: ICD-10-CM

## 2022-04-07 DIAGNOSIS — N52.9 ERECTILE DYSFUNCTION, UNSPECIFIED ERECTILE DYSFUNCTION TYPE: ICD-10-CM

## 2022-04-07 LAB
ALBUMIN SERPL-MCNC: 3.7 G/DL (ref 3.4–5)
ALBUMIN/GLOB SERPL: 0.9 {RATIO} (ref 1–2)
ALP LIVER SERPL-CCNC: 64 U/L
ALT SERPL-CCNC: 22 U/L
ANION GAP SERPL CALC-SCNC: 8 MMOL/L (ref 0–18)
AST SERPL-CCNC: 14 U/L (ref 15–37)
BASOPHILS # BLD AUTO: 0.03 X10(3) UL (ref 0–0.2)
BASOPHILS NFR BLD AUTO: 0.2 %
BILIRUB SERPL-MCNC: 0.3 MG/DL (ref 0.1–2)
BUN BLD-MCNC: 15 MG/DL (ref 7–18)
BUN/CREAT SERPL: 15.3 (ref 10–20)
CALCIUM BLD-MCNC: 9.4 MG/DL (ref 8.5–10.1)
CHLORIDE SERPL-SCNC: 103 MMOL/L (ref 98–112)
CHOLEST SERPL-MCNC: 172 MG/DL (ref ?–200)
CO2 SERPL-SCNC: 31 MMOL/L (ref 21–32)
CREAT BLD-MCNC: 0.98 MG/DL
CREAT UR-SCNC: 79.6 MG/DL
DEPRECATED RDW RBC AUTO: 46.5 FL (ref 35.1–46.3)
EOSINOPHIL # BLD AUTO: 0.07 X10(3) UL (ref 0–0.7)
EOSINOPHIL NFR BLD AUTO: 0.5 %
ERYTHROCYTE [DISTWIDTH] IN BLOOD BY AUTOMATED COUNT: 13.6 % (ref 11–15)
EST. AVERAGE GLUCOSE BLD GHB EST-MCNC: 134 MG/DL (ref 68–126)
FASTING PATIENT LIPID ANSWER: NO
FASTING STATUS PATIENT QL REPORTED: NO
GLOBULIN PLAS-MCNC: 3.9 G/DL (ref 2.8–4.4)
GLUCOSE BLD-MCNC: 88 MG/DL (ref 70–99)
HBA1C MFR BLD: 6.3 % (ref ?–5.7)
HCT VFR BLD AUTO: 40.7 %
HCV AB SERPL QL IA: NONREACTIVE
HDLC SERPL-MCNC: 66 MG/DL (ref 40–59)
HGB BLD-MCNC: 13.4 G/DL
IMM GRANULOCYTES # BLD AUTO: 0.02 X10(3) UL (ref 0–1)
IMM GRANULOCYTES NFR BLD: 0.1 %
LDLC SERPL CALC-MCNC: 85 MG/DL (ref ?–100)
LYMPHOCYTES # BLD AUTO: 9.95 X10(3) UL (ref 1–4)
LYMPHOCYTES NFR BLD AUTO: 74.5 %
MCH RBC QN AUTO: 30.7 PG (ref 26–34)
MCHC RBC AUTO-ENTMCNC: 32.9 G/DL (ref 31–37)
MCV RBC AUTO: 93.3 FL
MICROALBUMIN UR-MCNC: 0.51 MG/DL
MICROALBUMIN/CREAT 24H UR-RTO: 6.4 UG/MG (ref ?–30)
MONOCYTES # BLD AUTO: 0.68 X10(3) UL (ref 0.1–1)
MONOCYTES NFR BLD AUTO: 5.1 %
NEUTROPHILS # BLD AUTO: 2.61 X10 (3) UL (ref 1.5–7.7)
NEUTROPHILS # BLD AUTO: 2.61 X10(3) UL (ref 1.5–7.7)
NEUTROPHILS NFR BLD AUTO: 19.6 %
NONHDLC SERPL-MCNC: 106 MG/DL (ref ?–130)
OSMOLALITY SERPL CALC.SUM OF ELEC: 294 MOSM/KG (ref 275–295)
PLATELET # BLD AUTO: 215 10(3)UL (ref 150–450)
POTASSIUM SERPL-SCNC: 4.4 MMOL/L (ref 3.5–5.1)
PROT SERPL-MCNC: 7.6 G/DL (ref 6.4–8.2)
RBC # BLD AUTO: 4.36 X10(6)UL
SODIUM SERPL-SCNC: 142 MMOL/L (ref 136–145)
TRIGL SERPL-MCNC: 117 MG/DL (ref 30–149)
VIT D+METAB SERPL-MCNC: 62.2 NG/ML (ref 30–100)
VLDLC SERPL CALC-MCNC: 19 MG/DL (ref 0–30)
WBC # BLD AUTO: 13.4 X10(3) UL (ref 4–11)

## 2022-04-07 PROCEDURE — 3079F DIAST BP 80-89 MM HG: CPT | Performed by: FAMILY MEDICINE

## 2022-04-07 PROCEDURE — 90750 HZV VACC RECOMBINANT IM: CPT | Performed by: FAMILY MEDICINE

## 2022-04-07 PROCEDURE — 99397 PER PM REEVAL EST PAT 65+ YR: CPT | Performed by: FAMILY MEDICINE

## 2022-04-07 PROCEDURE — 80061 LIPID PANEL: CPT | Performed by: FAMILY MEDICINE

## 2022-04-07 PROCEDURE — 96160 PT-FOCUSED HLTH RISK ASSMT: CPT | Performed by: FAMILY MEDICINE

## 2022-04-07 PROCEDURE — 36415 COLL VENOUS BLD VENIPUNCTURE: CPT | Performed by: FAMILY MEDICINE

## 2022-04-07 PROCEDURE — 90472 IMMUNIZATION ADMIN EACH ADD: CPT | Performed by: FAMILY MEDICINE

## 2022-04-07 PROCEDURE — 80053 COMPREHEN METABOLIC PANEL: CPT | Performed by: FAMILY MEDICINE

## 2022-04-07 PROCEDURE — 86803 HEPATITIS C AB TEST: CPT | Performed by: FAMILY MEDICINE

## 2022-04-07 PROCEDURE — G0439 PPPS, SUBSEQ VISIT: HCPCS | Performed by: FAMILY MEDICINE

## 2022-04-07 PROCEDURE — 82570 ASSAY OF URINE CREATININE: CPT | Performed by: FAMILY MEDICINE

## 2022-04-07 PROCEDURE — 85025 COMPLETE CBC W/AUTO DIFF WBC: CPT | Performed by: FAMILY MEDICINE

## 2022-04-07 PROCEDURE — 82306 VITAMIN D 25 HYDROXY: CPT

## 2022-04-07 PROCEDURE — 85060 BLOOD SMEAR INTERPRETATION: CPT | Performed by: FAMILY MEDICINE

## 2022-04-07 PROCEDURE — 90471 IMMUNIZATION ADMIN: CPT | Performed by: FAMILY MEDICINE

## 2022-04-07 PROCEDURE — 83036 HEMOGLOBIN GLYCOSYLATED A1C: CPT | Performed by: FAMILY MEDICINE

## 2022-04-07 PROCEDURE — 3077F SYST BP >= 140 MM HG: CPT | Performed by: FAMILY MEDICINE

## 2022-04-07 PROCEDURE — 3044F HG A1C LEVEL LT 7.0%: CPT | Performed by: FAMILY MEDICINE

## 2022-04-07 PROCEDURE — G0009 ADMIN PNEUMOCOCCAL VACCINE: HCPCS | Performed by: FAMILY MEDICINE

## 2022-04-07 PROCEDURE — 3008F BODY MASS INDEX DOCD: CPT | Performed by: FAMILY MEDICINE

## 2022-04-07 PROCEDURE — 82043 UR ALBUMIN QUANTITATIVE: CPT | Performed by: FAMILY MEDICINE

## 2022-04-07 PROCEDURE — 90732 PPSV23 VACC 2 YRS+ SUBQ/IM: CPT | Performed by: FAMILY MEDICINE

## 2022-04-07 RX ORDER — LISINOPRIL 30 MG/1
30 TABLET ORAL DAILY
Qty: 90 TABLET | Refills: 0 | Status: SHIPPED | OUTPATIENT
Start: 2022-04-07

## 2022-04-10 ENCOUNTER — PATIENT MESSAGE (OUTPATIENT)
Dept: FAMILY MEDICINE CLINIC | Facility: CLINIC | Age: 67
End: 2022-04-10

## 2022-04-11 NOTE — TELEPHONE ENCOUNTER
Sherry Tamayo, RN 4/11/2022 11:25 AM CDT        ----- Message -----  From: Viry Phillips  Sent: 4/10/2022 4:15 PM CDT  To: Em Rn Triage  Subject: Question regarding MD Mario Pop More I can't remember who the hematology Dr I saw. Can you refer me to one.   Thanks

## 2022-04-12 ENCOUNTER — TELEPHONE (OUTPATIENT)
Dept: HEMATOLOGY/ONCOLOGY | Facility: HOSPITAL | Age: 67
End: 2022-04-12

## 2022-04-12 NOTE — TELEPHONE ENCOUNTER
Patient was referred by Dr Maritza Stacy, diagnosis Q69.713 (ICD-10-CM) - 288.60 (ICD-9-CM) - Leukocytosis, unspecified type.  Please provide date & time patient can be seen

## 2022-04-12 NOTE — TELEPHONE ENCOUNTER
Hem referral generated. Please provide Dr. Yury Bernabe info to pt so he can schedule evaluation. Thank you.

## 2022-04-18 ENCOUNTER — OFFICE VISIT (OUTPATIENT)
Dept: HEMATOLOGY/ONCOLOGY | Facility: HOSPITAL | Age: 67
End: 2022-04-18
Attending: INTERNAL MEDICINE
Payer: MEDICARE

## 2022-04-18 ENCOUNTER — LAB ENCOUNTER (OUTPATIENT)
Dept: LAB | Facility: HOSPITAL | Age: 67
End: 2022-04-18
Attending: INTERNAL MEDICINE
Payer: MEDICARE

## 2022-04-18 VITALS
SYSTOLIC BLOOD PRESSURE: 164 MMHG | WEIGHT: 243 LBS | TEMPERATURE: 97 F | OXYGEN SATURATION: 99 % | BODY MASS INDEX: 30.21 KG/M2 | HEIGHT: 75 IN | RESPIRATION RATE: 16 BRPM | DIASTOLIC BLOOD PRESSURE: 78 MMHG | HEART RATE: 110 BPM

## 2022-04-18 DIAGNOSIS — C91.10 CLL (CHRONIC LYMPHOCYTIC LEUKEMIA) (HCC): ICD-10-CM

## 2022-04-18 DIAGNOSIS — C91.10 CLL (CHRONIC LYMPHOCYTIC LEUKEMIA) (HCC): Primary | ICD-10-CM

## 2022-04-18 LAB
B2 MICROGLOB SERPL-MCNC: 0.17 MG/DL (ref 0.11–0.25)
ERYTHROCYTE [SEDIMENTATION RATE] IN BLOOD: 22 MM/HR
HAV AB SER QL IA: NONREACTIVE
HBV CORE AB SERPL QL IA: NONREACTIVE
HBV SURFACE AB SER QL: NONREACTIVE
HBV SURFACE AB SERPL IA-ACNC: <3.1 MIU/ML
HBV SURFACE AG SERPL QL IA: NONREACTIVE
HCV AB SERPL QL IA: NONREACTIVE
IGA SERPL-MCNC: 341 MG/DL (ref 70–312)
IGM SERPL-MCNC: 31.3 MG/DL (ref 43–279)
IMMUNOGLOBULIN PNL SER-MCNC: 1400 MG/DL (ref 791–1643)
LDH SERPL L TO P-CCNC: 135 U/L
URATE SERPL-MCNC: 8.3 MG/DL

## 2022-04-18 PROCEDURE — 86334 IMMUNOFIX E-PHORESIS SERUM: CPT

## 2022-04-18 PROCEDURE — 85652 RBC SED RATE AUTOMATED: CPT

## 2022-04-18 PROCEDURE — 86706 HEP B SURFACE ANTIBODY: CPT

## 2022-04-18 PROCEDURE — 84550 ASSAY OF BLOOD/URIC ACID: CPT

## 2022-04-18 PROCEDURE — 99204 OFFICE O/P NEW MOD 45 MIN: CPT | Performed by: INTERNAL MEDICINE

## 2022-04-18 PROCEDURE — 80503 PATH CLIN CONSLTJ SF 5-20: CPT

## 2022-04-18 PROCEDURE — 83521 IG LIGHT CHAINS FREE EACH: CPT

## 2022-04-18 PROCEDURE — 84165 PROTEIN E-PHORESIS SERUM: CPT

## 2022-04-18 PROCEDURE — 82784 ASSAY IGA/IGD/IGG/IGM EACH: CPT

## 2022-04-18 PROCEDURE — 87340 HEPATITIS B SURFACE AG IA: CPT

## 2022-04-18 PROCEDURE — 86803 HEPATITIS C AB TEST: CPT

## 2022-04-18 PROCEDURE — 87389 HIV-1 AG W/HIV-1&-2 AB AG IA: CPT

## 2022-04-18 PROCEDURE — 82232 ASSAY OF BETA-2 PROTEIN: CPT

## 2022-04-18 PROCEDURE — 86708 HEPATITIS A ANTIBODY: CPT

## 2022-04-18 PROCEDURE — 36415 COLL VENOUS BLD VENIPUNCTURE: CPT

## 2022-04-18 PROCEDURE — 86704 HEP B CORE ANTIBODY TOTAL: CPT

## 2022-04-18 PROCEDURE — 88275 CYTOGENETICS 100-300: CPT

## 2022-04-18 PROCEDURE — 88271 CYTOGENETICS DNA PROBE: CPT

## 2022-04-18 PROCEDURE — 83615 LACTATE (LD) (LDH) ENZYME: CPT

## 2022-04-19 ENCOUNTER — NURSE TRIAGE (OUTPATIENT)
Dept: FAMILY MEDICINE CLINIC | Facility: CLINIC | Age: 67
End: 2022-04-19

## 2022-04-19 LAB
ALBUMIN SERPL ELPH-MCNC: 4.2 G/DL (ref 3.75–5.21)
ALBUMIN/GLOB SERPL: 1.2 {RATIO} (ref 1–2)
ALPHA1 GLOB SERPL ELPH-MCNC: 0.3 G/DL (ref 0.19–0.46)
ALPHA2 GLOB SERPL ELPH-MCNC: 0.86 G/DL (ref 0.48–1.05)
B-GLOBULIN SERPL ELPH-MCNC: 0.95 G/DL (ref 0.68–1.23)
GAMMA GLOB SERPL ELPH-MCNC: 1.39 G/DL (ref 0.62–1.7)
KAPPA LC FREE SER-MCNC: 2.49 MG/DL (ref 0.33–1.94)
KAPPA LC FREE/LAMBDA FREE SER NEPH: 1.41 {RATIO} (ref 0.26–1.65)
LAMBDA LC FREE SERPL-MCNC: 1.77 MG/DL (ref 0.57–2.63)
PROT SERPL-MCNC: 7.7 G/DL (ref 6.4–8.2)

## 2022-04-19 NOTE — TELEPHONE ENCOUNTER
----- Message from Alla Opitz sent at 4/19/2022  1:00 PM CDT -----  Regarding: Medication   Dr Jamie Robertson i got a burning sensation on my right side. Feel like an infection. Do I need to come in or can you prescribe some antibiotics.    Thanks

## 2022-04-21 NOTE — TELEPHONE ENCOUNTER
Pt needs to be evaluated and may need abdominal ultrasound. Can schedule earliest available SDA with me if pain is more mild. If moderate-severe or if he has N/V or fevers, he should be evaluated in ER.

## 2022-04-26 ENCOUNTER — TELEMEDICINE (OUTPATIENT)
Dept: FAMILY MEDICINE CLINIC | Facility: CLINIC | Age: 67
End: 2022-04-26
Payer: COMMERCIAL

## 2022-04-26 ENCOUNTER — TELEPHONE (OUTPATIENT)
Dept: FAMILY MEDICINE CLINIC | Facility: CLINIC | Age: 67
End: 2022-04-26

## 2022-04-26 DIAGNOSIS — R10.9 RIGHT FLANK PAIN: Primary | ICD-10-CM

## 2022-04-26 DIAGNOSIS — Z20.822 CLOSE EXPOSURE TO COVID-19 VIRUS: ICD-10-CM

## 2022-04-26 PROCEDURE — 99213 OFFICE O/P EST LOW 20 MIN: CPT | Performed by: FAMILY MEDICINE

## 2022-04-26 NOTE — TELEPHONE ENCOUNTER
Called patient back informed him that we can do a video visit or he could go to the immediate care. Patient would like to do video visit with Jody Lozano. appt changed to video visit.

## 2022-04-26 NOTE — TELEPHONE ENCOUNTER
Pt and spouse took at-home antigen COVID-19 tests. Pt negative, spouse positive. Pt currently denies any covid related symptoms. Fully vaccinated. This PSR conferred with [facility] Files who will confer with MP if okay to present to appointment today at 1:30.  Per [employer], will contact patient after speaking with MP.

## 2022-04-28 ENCOUNTER — LAB ENCOUNTER (OUTPATIENT)
Dept: LAB | Facility: REFERENCE LAB | Age: 67
End: 2022-04-28
Attending: FAMILY MEDICINE
Payer: MEDICARE

## 2022-04-28 DIAGNOSIS — R10.9 RIGHT FLANK PAIN: ICD-10-CM

## 2022-04-28 LAB
BILIRUB UR QL: NEGATIVE
CLARITY UR: CLEAR
COLOR UR: YELLOW
GLUCOSE UR-MCNC: NEGATIVE MG/DL
KETONES UR-MCNC: NEGATIVE MG/DL
LEUKOCYTE ESTERASE UR QL STRIP.AUTO: NEGATIVE
NITRITE UR QL STRIP.AUTO: NEGATIVE
PH UR: 5 [PH] (ref 5–8)
PROT UR-MCNC: NEGATIVE MG/DL
SP GR UR STRIP: 1.01 (ref 1–1.03)
UROBILINOGEN UR STRIP-ACNC: <2
VIT C UR-MCNC: NEGATIVE MG/DL

## 2022-04-28 PROCEDURE — 81001 URINALYSIS AUTO W/SCOPE: CPT

## 2022-05-19 ENCOUNTER — OFFICE VISIT (OUTPATIENT)
Dept: HEMATOLOGY/ONCOLOGY | Facility: HOSPITAL | Age: 67
End: 2022-05-19
Attending: INTERNAL MEDICINE
Payer: MEDICARE

## 2022-05-19 VITALS
WEIGHT: 245 LBS | SYSTOLIC BLOOD PRESSURE: 148 MMHG | DIASTOLIC BLOOD PRESSURE: 79 MMHG | HEART RATE: 85 BPM | TEMPERATURE: 99 F | RESPIRATION RATE: 16 BRPM | OXYGEN SATURATION: 97 % | HEIGHT: 75 IN | BODY MASS INDEX: 30.46 KG/M2

## 2022-05-19 DIAGNOSIS — C91.10 CLL (CHRONIC LYMPHOCYTIC LEUKEMIA) (HCC): Primary | ICD-10-CM

## 2022-05-19 PROCEDURE — 99214 OFFICE O/P EST MOD 30 MIN: CPT | Performed by: INTERNAL MEDICINE

## 2022-06-02 RX ORDER — LISINOPRIL 30 MG/1
30 TABLET ORAL DAILY
Qty: 90 TABLET | Refills: 3 | Status: SHIPPED | OUTPATIENT
Start: 2022-06-02

## 2022-06-02 NOTE — TELEPHONE ENCOUNTER
Refill passed per [employer], [employer] protocol, but [employer] requesting one year supply--unable to send that quantity per protocol    Please send, if appropriate      Requested Prescriptions   Pending Prescriptions Disp Refills    LISINOPRIL 30 MG Oral Tab [Pharmacy Med Name: Lisinopril 30 MG Oral Tablet] 90 tablet 3     Sig: TAKE 1 TABLET BY MOUTH  DAILY        Hypertensive Medications Protocol Passed - 6/1/2022 11:25 PM        Passed - CMP or BMP in past 12 months        Passed - Appointment in past 6 or next 3 months        Passed - GFR  > 50     Lab Results   Component Value Date    GFRAA 92 04/07/2022                         Recent Outpatient Visits              2 weeks ago CLL (chronic lymphocytic leukemia) [facility] AND [facility] Hematology Oncology Ally Atkins MD    Office Visit    1 month ago Right flank pain    [address] [place] [address] Kyara Munoz DO    Telemedicine    1 month ago CLL (chronic lymphocytic leukemia) [facility] AND [facility] Hematology Oncology Ally Atkins MD    Office Visit    1 month ago Encounter for annual health examination    [address] Kristina Munoz DO    Office Visit    5 months ago Type 2 diabetes mellitus without complication, without long-term current use of insulin [facility])    [address] Kyara Munoz DO    Office Visit             Future Appointments         Provider Department Appt Notes    In 5 months [facility] [address] - Infusion FOLLOW UP VISIT. CL  4W  date per pt    In 5 months [address] Hematology Oncology FOLLOW UP VISIT. CL  4W  date per pt

## 2022-07-01 ENCOUNTER — NURSE ONLY (OUTPATIENT)
Dept: GASTROENTEROLOGY | Facility: CLINIC | Age: 67
End: 2022-07-01

## 2022-07-01 DIAGNOSIS — Z12.11 SCREEN FOR COLON CANCER: Primary | ICD-10-CM

## 2022-07-01 RX ORDER — SIMVASTATIN 40 MG
40 TABLET ORAL NIGHTLY
Qty: 90 TABLET | Refills: 3 | Status: SHIPPED | OUTPATIENT
Start: 2022-07-01

## 2022-07-01 NOTE — PROGRESS NOTES
Called patient for a scheduled telephone colon screening.  [facility]

## 2022-07-01 NOTE — PROGRESS NOTES
Called patient for a scheduled telephone colon screening. GI MD preference: none  Insurance:  Medicare aarp   CBC from: 4/7/2022 resulted in epic   PCP telemedicine visit on: 4/262022     Last Procedure, Date, MD:  2017 @ [facility]   Last Diagnosis:  Per pt. 3 polyps found   Recalled for (mth/yrs): pt. States 5 yrs     Anticoagulants: no   Diabetic Meds: METFORMIN   BP meds(Ace inhibitors/ARB's): LISINOPRIL PM DOSE   Weight loss meds (phentermine/vyvanse): no  Iron supplement (RX/OTC): no   Height & Weight/BMI: 6'3\"/245lbs/30   Hx of Cardiac/CVA issues/(MI/Stroke): no   Devices Pacemaker/Defibrillator/Stents: no   Resp. Issues/Oxygen Use/DEBORAH/COPD: no   Issues w/Anesthesia: no     Symptoms (Y/N): no     Family history of colon cancer: no     Medications, pharmacy, and allergies verified with patient over the phone. Please advise on orders and prep, thank you.

## 2022-07-06 RX ORDER — POLYETHYLENE GLYCOL 3350, SODIUM CHLORIDE, SODIUM BICARBONATE, POTASSIUM CHLORIDE 420; 11.2; 5.72; 1.48 G/4L; G/4L; G/4L; G/4L
POWDER, FOR SOLUTION ORAL
Qty: 4000 ML | Refills: 0 | Status: SHIPPED | OUTPATIENT
Start: 2022-07-06

## 2022-07-06 NOTE — PROGRESS NOTES
Scheduling:  cln w/ mac w/ mac w/ Dr. Brandee Soriano or Dr. Jesus Yepez  Dx:crc screening  trilyte split dose sent e-scribe    Hold metformin day before and day of procedure  Hold lisinopril am of procedure

## 2022-09-21 ENCOUNTER — ANESTHESIA (OUTPATIENT)
Dept: ENDOSCOPY | Age: 67
End: 2022-09-21

## 2022-09-21 ENCOUNTER — HOSPITAL ENCOUNTER (OUTPATIENT)
Age: 67
Setting detail: HOSPITAL OUTPATIENT SURGERY
Discharge: HOME | End: 2022-09-21
Attending: INTERNAL MEDICINE | Admitting: INTERNAL MEDICINE

## 2022-09-21 ENCOUNTER — ANESTHESIA EVENT (OUTPATIENT)
Dept: ENDOSCOPY | Age: 67
End: 2022-09-21

## 2022-09-21 VITALS
HEIGHT: 75 IN | SYSTOLIC BLOOD PRESSURE: 130 MMHG | BODY MASS INDEX: 29.97 KG/M2 | RESPIRATION RATE: 17 BRPM | OXYGEN SATURATION: 100 % | DIASTOLIC BLOOD PRESSURE: 75 MMHG | HEART RATE: 83 BPM | WEIGHT: 241 LBS

## 2022-09-21 DIAGNOSIS — Z12.11 SCREEN FOR COLON CANCER: ICD-10-CM

## 2022-09-21 PROCEDURE — 88305 TISSUE EXAM BY PATHOLOGIST: CPT | Performed by: INTERNAL MEDICINE

## 2022-09-21 RX ORDER — SODIUM CHLORIDE, SODIUM LACTATE, POTASSIUM CHLORIDE, CALCIUM CHLORIDE 600; 310; 30; 20 MG/100ML; MG/100ML; MG/100ML; MG/100ML
INJECTION, SOLUTION INTRAVENOUS CONTINUOUS
OUTPATIENT
Start: 2022-09-21

## 2022-09-21 RX ORDER — SODIUM CHLORIDE, SODIUM LACTATE, POTASSIUM CHLORIDE, CALCIUM CHLORIDE 600; 310; 30; 20 MG/100ML; MG/100ML; MG/100ML; MG/100ML
INJECTION, SOLUTION INTRAVENOUS CONTINUOUS
Status: DISCONTINUED | OUTPATIENT
Start: 2022-09-21 | End: 2022-09-21

## 2022-09-21 RX ORDER — NICOTINE POLACRILEX 4 MG
15 LOZENGE BUCCAL
OUTPATIENT
Start: 2022-09-21

## 2022-09-21 RX ORDER — NICOTINE POLACRILEX 4 MG
30 LOZENGE BUCCAL
OUTPATIENT
Start: 2022-09-21

## 2022-09-21 RX ORDER — LIDOCAINE HYDROCHLORIDE 10 MG/ML
INJECTION, SOLUTION EPIDURAL; INFILTRATION; INTRACAUDAL; PERINEURAL AS NEEDED
Status: DISCONTINUED | OUTPATIENT
Start: 2022-09-21 | End: 2022-09-21 | Stop reason: SURG

## 2022-09-21 RX ORDER — NALOXONE HYDROCHLORIDE 0.4 MG/ML
80 INJECTION, SOLUTION INTRAMUSCULAR; INTRAVENOUS; SUBCUTANEOUS AS NEEDED
OUTPATIENT
Start: 2022-09-21 | End: 2022-09-21

## 2022-09-21 RX ORDER — DEXTROSE MONOHYDRATE 25 G/50ML
50 INJECTION, SOLUTION INTRAVENOUS
OUTPATIENT
Start: 2022-09-21

## 2022-09-21 RX ADMIN — SODIUM CHLORIDE, SODIUM LACTATE, POTASSIUM CHLORIDE, CALCIUM CHLORIDE: 600; 310; 30; 20 INJECTION, SOLUTION INTRAVENOUS at 08:01:00

## 2022-09-21 RX ADMIN — LIDOCAINE HYDROCHLORIDE 50 MG: 10 INJECTION, SOLUTION EPIDURAL; INFILTRATION; INTRACAUDAL; PERINEURAL at 08:02:00

## 2022-09-21 RX ADMIN — SODIUM CHLORIDE, SODIUM LACTATE, POTASSIUM CHLORIDE, CALCIUM CHLORIDE: 600; 310; 30; 20 INJECTION, SOLUTION INTRAVENOUS at 08:26:00

## 2022-09-21 NOTE — PRE-SEDATION ASSESSMENT
Physician Pre-Sedation Assessment    Pre-Sedation Assessment:    Sedation History: Previous Sedation with No Complications and Airway Assessed    Manual BS was 119    Cardiac: normal S1, S2  Respiratory: breath sounds clear bilaterally   Abdomen: soft, BS (+), non-tender    ASA Classification: 2.  Patient with mild systemic disease    Plan: MAC sedation

## 2022-09-21 NOTE — ANESTHESIA POSTPROCEDURE EVALUATION
Patient: Freida Merritt    Procedure Summary     Date: 09/21/22 Room / Location: [facility] ENDOSCOPY 01 / [facility] ENDO    Anesthesia Start: 0801 Anesthesia Stop: 1002    Procedure: COLONOSCOPY (N/A ) Diagnosis:       Screen for colon cancer      (polyp, diverticulosis, hemorrhoids)    Surgeons: Niranjan Ortiz MD Anesthesiologist:     Anesthesia Type: MAC ASA Status: 3          Anesthesia Type: MAC    Vitals Value Taken Time   /75 09/21/22 0828   Temp  09/21/22 0829   Pulse 83 09/21/22 0829   Resp 16 09/21/22 0829   SpO2 100 % 09/21/22 0829   Vitals shown include unvalidated device data.     [facility] AN Post Evaluation:   Patient Evaluated in PACU  Patient Participation: complete - patient participated  Level of Consciousness: awake  Pain Score: 0  Pain Management: adequate  Airway Patency:patent  Dental exam unchanged from preop  Yes    Cardiovascular Status: acceptable  Respiratory Status: acceptable  Postoperative Hydration acceptable      Margoth Connelly MD  9/21/2022 8:29 AM

## 2022-09-23 ENCOUNTER — MED REC SCAN ONLY (OUTPATIENT)
Dept: GASTROENTEROLOGY | Facility: CLINIC | Age: 67
End: 2022-09-23

## 2022-09-27 NOTE — TELEPHONE ENCOUNTER
Refill passed per [employer], [facility] protocol. Requested Prescriptions   Pending Prescriptions Disp Refills    METFORMIN HCL 1000 MG Oral Tab [Pharmacy Med Name: metFORMIN HCl 1000 MG Oral Tablet] 90 tablet 3     Sig: TAKE 1 TABLET BY MOUTH  DAILY WITH BREAKFAST        Diabetes Medication Protocol Passed - 9/26/2022  9:32 PM        Passed - Last A1C < 7.5 and within past 6 months     Lab Results   Component Value Date    A1C 6.3 (H) 04/07/2022               Passed - In person appointment or virtual visit in the past 6 mos or appointment in next 3 mos       Recent Outpatient Visits              2 months ago Screen for colon cancer    [address] [facility] PROCEDURE    Nurse Only    4 months ago CLL (chronic lymphocytic leukemia) [facility] Hematology Oncology Jose Eduardo Berry MD    Office Visit    5 months ago Right flank pain    [address] [place] [address] Lindsay Brody DO    [facility]    5 months ago CLL (chronic lymphocytic leukemia) [facility] Hematology Oncology Jose Eduardo Berry MD    Office Visit    5 months ago Encounter for annual health examination    [address] Lindsay Brody [place]    Office Visit     Future Appointments         Provider Department Appt Notes    In 1 month RAMÍREZ Shoemaker. CL  4W  date per pt    In 2 month [address] [place] [address] Hematology Oncology FOLLOW UP VISIT. CL  4W  date per pt               Passed - EGFRCR or GFRAA > 50     GFR Evaluation  GFRAA: 92 , resulted on 4/7/2022            Passed - GFR in the past 12 months            Recent Outpatient Visits              2 months ago Screen for colon cancer    [address] PROCEDURE    Nurse Only    4 months ago CLL (chronic lymphocytic leukemia) [facility] Hematology Oncology Jose Eduardo Berry MD    Office Visit    5 months ago Right flank pain    [facility] Fran Negrete DO    Telemedicine    5 months ago CLL (chronic lymphocytic leukemia) [facility])    [facility] Hematology Oncology Ilah Dakin, MD    Office Visit    5 months ago Encounter for annual health examination    [address] Fran Negrete [place]    Office Visit          Future Appointments         Provider Department Appt Notes    [facility]  [facility]  date per pt    In 2 month Ilah Dakin, [address] Hematology Oncology FOLLOW UP VISIT. CL  4W  date per pt

## 2022-09-28 ENCOUNTER — TELEPHONE (OUTPATIENT)
Dept: GASTROENTEROLOGY | Facility: CLINIC | Age: 67
End: 2022-09-28

## 2022-09-28 NOTE — TELEPHONE ENCOUNTER
----- Message from Tania Humphreys MD sent at 9/27/2022  4:42 PM CDT -----  GI staff: please place recall for colonoscopy in 5 years

## 2022-09-30 ENCOUNTER — LAB ENCOUNTER (OUTPATIENT)
Dept: LAB | Facility: REFERENCE LAB | Age: 67
End: 2022-09-30
Attending: FAMILY MEDICINE
Payer: MEDICARE

## 2022-09-30 ENCOUNTER — OFFICE VISIT (OUTPATIENT)
Dept: FAMILY MEDICINE CLINIC | Facility: CLINIC | Age: 67
End: 2022-09-30

## 2022-09-30 VITALS
HEART RATE: 83 BPM | WEIGHT: 238 LBS | HEIGHT: 75 IN | BODY MASS INDEX: 29.59 KG/M2 | DIASTOLIC BLOOD PRESSURE: 70 MMHG | OXYGEN SATURATION: 98 % | SYSTOLIC BLOOD PRESSURE: 134 MMHG

## 2022-09-30 DIAGNOSIS — E11.9 TYPE 2 DIABETES MELLITUS WITHOUT COMPLICATION, WITHOUT LONG-TERM CURRENT USE OF INSULIN (HCC): ICD-10-CM

## 2022-09-30 DIAGNOSIS — R20.0 NUMBNESS AND TINGLING OF HAND: Primary | ICD-10-CM

## 2022-09-30 DIAGNOSIS — I10 ESSENTIAL HYPERTENSION: ICD-10-CM

## 2022-09-30 DIAGNOSIS — C91.10 CLL (CHRONIC LYMPHOCYTIC LEUKEMIA) (HCC): ICD-10-CM

## 2022-09-30 DIAGNOSIS — R20.2 NUMBNESS AND TINGLING OF HAND: Primary | ICD-10-CM

## 2022-09-30 LAB
ALBUMIN SERPL-MCNC: 3.8 G/DL (ref 3.4–5)
ALBUMIN/GLOB SERPL: 1 {RATIO} (ref 1–2)
ALP LIVER SERPL-CCNC: 54 U/L
ALT SERPL-CCNC: 21 U/L
ANION GAP SERPL CALC-SCNC: 5 MMOL/L (ref 0–18)
AST SERPL-CCNC: 15 U/L (ref 15–37)
BASOPHILS # BLD AUTO: 0.03 X10(3) UL (ref 0–0.2)
BASOPHILS NFR BLD AUTO: 0.3 %
BILIRUB SERPL-MCNC: 0.5 MG/DL (ref 0.1–2)
BUN BLD-MCNC: 17 MG/DL (ref 7–18)
BUN/CREAT SERPL: 17.3 (ref 10–20)
CALCIUM BLD-MCNC: 9.1 MG/DL (ref 8.5–10.1)
CHLORIDE SERPL-SCNC: 105 MMOL/L (ref 98–112)
CO2 SERPL-SCNC: 28 MMOL/L (ref 21–32)
CREAT BLD-MCNC: 0.98 MG/DL
DEPRECATED RDW RBC AUTO: 49.7 FL (ref 35.1–46.3)
EOSINOPHIL # BLD AUTO: 0.1 X10(3) UL (ref 0–0.7)
EOSINOPHIL NFR BLD AUTO: 1 %
ERYTHROCYTE [DISTWIDTH] IN BLOOD BY AUTOMATED COUNT: 14.1 % (ref 11–15)
EST. AVERAGE GLUCOSE BLD GHB EST-MCNC: 128 MG/DL (ref 68–126)
FASTING STATUS PATIENT QL REPORTED: NO
GFR SERPLBLD BASED ON 1.73 SQ M-ARVRAT: 85 ML/MIN/1.73M2 (ref 60–?)
GLOBULIN PLAS-MCNC: 3.9 G/DL (ref 2.8–4.4)
GLUCOSE BLD-MCNC: 102 MG/DL (ref 70–99)
HBA1C MFR BLD: 6.1 % (ref ?–5.7)
HCT VFR BLD AUTO: 40.6 %
HGB BLD-MCNC: 13.5 G/DL
IMM GRANULOCYTES # BLD AUTO: 0.02 X10(3) UL (ref 0–1)
IMM GRANULOCYTES NFR BLD: 0.2 %
LDH SERPL L TO P-CCNC: 151 U/L
LYMPHOCYTES # BLD AUTO: 7.29 X10(3) UL (ref 1–4)
LYMPHOCYTES NFR BLD AUTO: 72.4 %
MCH RBC QN AUTO: 31.6 PG (ref 26–34)
MCHC RBC AUTO-ENTMCNC: 33.3 G/DL (ref 31–37)
MCV RBC AUTO: 95.1 FL
MONOCYTES # BLD AUTO: 0.5 X10(3) UL (ref 0.1–1)
MONOCYTES NFR BLD AUTO: 5 %
NEUTROPHILS # BLD AUTO: 2.13 X10 (3) UL (ref 1.5–7.7)
NEUTROPHILS # BLD AUTO: 2.13 X10(3) UL (ref 1.5–7.7)
NEUTROPHILS NFR BLD AUTO: 21.1 %
OSMOLALITY SERPL CALC.SUM OF ELEC: 288 MOSM/KG (ref 275–295)
PLATELET # BLD AUTO: 164 10(3)UL (ref 150–450)
PLATELET MORPHOLOGY: NORMAL
POTASSIUM SERPL-SCNC: 4.1 MMOL/L (ref 3.5–5.1)
PROT SERPL-MCNC: 7.7 G/DL (ref 6.4–8.2)
RBC # BLD AUTO: 4.27 X10(6)UL
SODIUM SERPL-SCNC: 138 MMOL/L (ref 136–145)
URATE SERPL-MCNC: 8.2 MG/DL
WBC # BLD AUTO: 10.1 X10(3) UL (ref 4–11)

## 2022-09-30 PROCEDURE — 3078F DIAST BP <80 MM HG: CPT | Performed by: FAMILY MEDICINE

## 2022-09-30 PROCEDURE — 84550 ASSAY OF BLOOD/URIC ACID: CPT

## 2022-09-30 PROCEDURE — 83615 LACTATE (LD) (LDH) ENZYME: CPT

## 2022-09-30 PROCEDURE — 36415 COLL VENOUS BLD VENIPUNCTURE: CPT

## 2022-09-30 PROCEDURE — 85025 COMPLETE CBC W/AUTO DIFF WBC: CPT

## 2022-09-30 PROCEDURE — 80053 COMPREHEN METABOLIC PANEL: CPT

## 2022-09-30 PROCEDURE — 83036 HEMOGLOBIN GLYCOSYLATED A1C: CPT | Performed by: FAMILY MEDICINE

## 2022-09-30 PROCEDURE — 3075F SYST BP GE 130 - 139MM HG: CPT | Performed by: FAMILY MEDICINE

## 2022-09-30 PROCEDURE — 3008F BODY MASS INDEX DOCD: CPT | Performed by: FAMILY MEDICINE

## 2022-09-30 PROCEDURE — 99214 OFFICE O/P EST MOD 30 MIN: CPT | Performed by: FAMILY MEDICINE

## 2022-11-02 ENCOUNTER — TELEPHONE (OUTPATIENT)
Dept: FAMILY MEDICINE CLINIC | Facility: CLINIC | Age: 67
End: 2022-11-02

## 2022-11-02 NOTE — TELEPHONE ENCOUNTER
Reached patient for medication adherence consult. Per insurance report, patient is past due for refill on simvastatin. Patient reports he is still taking this medication and did just have it refilled last month. Looks like there were some gaps in refilling the simvastatin over the past year, patient cannot think of reason as to why this may be. Did provide education and stressed the importance of taking simvastatin consistently everyday to get the most benefit. Patient denies having challenges remembering to take the statin before bed. Again stressed importance of getting a dose in each day and educated on importance of taking at same time each day; a good time that he will remember the medication. Patient confirmed understanding and denies any questions or concerns with medications at this time.

## 2022-11-15 ENCOUNTER — LAB ENCOUNTER (OUTPATIENT)
Dept: LAB | Facility: HOSPITAL | Age: 67
End: 2022-11-15
Attending: INTERNAL MEDICINE
Payer: MEDICARE

## 2022-11-15 ENCOUNTER — APPOINTMENT (OUTPATIENT)
Dept: HEMATOLOGY/ONCOLOGY | Facility: HOSPITAL | Age: 67
End: 2022-11-15
Attending: INTERNAL MEDICINE
Payer: MEDICARE

## 2022-11-15 VITALS
DIASTOLIC BLOOD PRESSURE: 61 MMHG | HEIGHT: 75 IN | RESPIRATION RATE: 18 BRPM | SYSTOLIC BLOOD PRESSURE: 147 MMHG | WEIGHT: 239.19 LBS | HEART RATE: 86 BPM | BODY MASS INDEX: 29.74 KG/M2 | OXYGEN SATURATION: 100 % | TEMPERATURE: 99 F

## 2022-11-15 DIAGNOSIS — C91.10 CLL (CHRONIC LYMPHOCYTIC LEUKEMIA) (HCC): ICD-10-CM

## 2022-11-15 DIAGNOSIS — C91.10 CLL (CHRONIC LYMPHOCYTIC LEUKEMIA) (HCC): Primary | ICD-10-CM

## 2022-11-15 LAB
ALBUMIN SERPL-MCNC: 3.7 G/DL (ref 3.4–5)
ALBUMIN/GLOB SERPL: 0.9 {RATIO} (ref 1–2)
ALP LIVER SERPL-CCNC: 63 U/L
ALT SERPL-CCNC: 18 U/L
ANION GAP SERPL CALC-SCNC: 4 MMOL/L (ref 0–18)
AST SERPL-CCNC: 15 U/L (ref 15–37)
BASOPHILS # BLD: 0 X10(3) UL (ref 0–0.2)
BASOPHILS NFR BLD: 0 %
BILIRUB SERPL-MCNC: 0.3 MG/DL (ref 0.1–2)
BUN BLD-MCNC: 12 MG/DL (ref 7–18)
BUN/CREAT SERPL: 12 (ref 10–20)
CALCIUM BLD-MCNC: 9.8 MG/DL (ref 8.5–10.1)
CHLORIDE SERPL-SCNC: 104 MMOL/L (ref 98–112)
CO2 SERPL-SCNC: 29 MMOL/L (ref 21–32)
CREAT BLD-MCNC: 1 MG/DL
DEPRECATED RDW RBC AUTO: 50.3 FL (ref 35.1–46.3)
EOSINOPHIL # BLD: 0 X10(3) UL (ref 0–0.7)
EOSINOPHIL NFR BLD: 0 %
ERYTHROCYTE [DISTWIDTH] IN BLOOD BY AUTOMATED COUNT: 14.1 % (ref 11–15)
FASTING STATUS PATIENT QL REPORTED: YES
GFR SERPLBLD BASED ON 1.73 SQ M-ARVRAT: 82 ML/MIN/1.73M2 (ref 60–?)
GLOBULIN PLAS-MCNC: 4.1 G/DL (ref 2.8–4.4)
GLUCOSE BLD-MCNC: 101 MG/DL (ref 70–99)
HCT VFR BLD AUTO: 40.2 %
HGB BLD-MCNC: 13.3 G/DL
LDH SERPL L TO P-CCNC: 139 U/L
LYMPHOCYTES NFR BLD: 80 %
LYMPHOCYTES NFR BLD: 9.84 X10(3) UL (ref 1–4)
MCH RBC QN AUTO: 31.7 PG (ref 26–34)
MCHC RBC AUTO-ENTMCNC: 33.1 G/DL (ref 31–37)
MCV RBC AUTO: 95.9 FL
MONOCYTES # BLD: 0 X10(3) UL (ref 0.1–1)
MONOCYTES NFR BLD: 0 %
MORPHOLOGY: NORMAL
NEUTROPHILS # BLD AUTO: 2.23 X10 (3) UL (ref 1.5–7.7)
NEUTROPHILS NFR BLD: 20 %
NEUTS HYPERSEG # BLD: 2.46 X10(3) UL (ref 1.5–7.7)
OSMOLALITY SERPL CALC.SUM OF ELEC: 284 MOSM/KG (ref 275–295)
PLATELET # BLD AUTO: 175 10(3)UL (ref 150–450)
PLATELET MORPHOLOGY: NORMAL
POTASSIUM SERPL-SCNC: 4.3 MMOL/L (ref 3.5–5.1)
PROT SERPL-MCNC: 7.8 G/DL (ref 6.4–8.2)
RBC # BLD AUTO: 4.19 X10(6)UL
SODIUM SERPL-SCNC: 137 MMOL/L (ref 136–145)
TOTAL CELLS COUNTED BLD: 100
WBC # BLD AUTO: 12.3 X10(3) UL (ref 4–11)

## 2022-11-15 PROCEDURE — 80053 COMPREHEN METABOLIC PANEL: CPT

## 2022-11-15 PROCEDURE — 99214 OFFICE O/P EST MOD 30 MIN: CPT | Performed by: INTERNAL MEDICINE

## 2022-11-15 PROCEDURE — 85007 BL SMEAR W/DIFF WBC COUNT: CPT

## 2022-11-15 PROCEDURE — 36415 COLL VENOUS BLD VENIPUNCTURE: CPT

## 2022-11-15 PROCEDURE — 85027 COMPLETE CBC AUTOMATED: CPT

## 2022-11-15 PROCEDURE — 83615 LACTATE (LD) (LDH) ENZYME: CPT

## 2022-11-15 PROCEDURE — 85025 COMPLETE CBC W/AUTO DIFF WBC: CPT

## 2023-02-21 RX ORDER — ERGOCALCIFEROL 1.25 MG/1
CAPSULE ORAL
Qty: 13 CAPSULE | Refills: 3 | Status: SHIPPED | OUTPATIENT
Start: 2023-02-21

## 2023-03-02 ENCOUNTER — LAB ENCOUNTER (OUTPATIENT)
Dept: LAB | Facility: REFERENCE LAB | Age: 68
End: 2023-03-02
Attending: FAMILY MEDICINE
Payer: MEDICARE

## 2023-03-02 ENCOUNTER — OFFICE VISIT (OUTPATIENT)
Facility: CLINIC | Age: 68
End: 2023-03-02
Payer: COMMERCIAL

## 2023-03-02 VITALS
WEIGHT: 237 LBS | SYSTOLIC BLOOD PRESSURE: 152 MMHG | BODY MASS INDEX: 29.47 KG/M2 | HEIGHT: 75 IN | DIASTOLIC BLOOD PRESSURE: 80 MMHG | HEART RATE: 83 BPM | OXYGEN SATURATION: 98 %

## 2023-03-02 DIAGNOSIS — E11.9 TYPE 2 DIABETES MELLITUS WITHOUT COMPLICATION, WITHOUT LONG-TERM CURRENT USE OF INSULIN (HCC): ICD-10-CM

## 2023-03-02 DIAGNOSIS — E55.9 VITAMIN D DEFICIENCY: ICD-10-CM

## 2023-03-02 DIAGNOSIS — I10 ESSENTIAL HYPERTENSION: ICD-10-CM

## 2023-03-02 DIAGNOSIS — R20.2 NUMBNESS AND TINGLING OF HAND: ICD-10-CM

## 2023-03-02 DIAGNOSIS — E78.5 DYSLIPIDEMIA: ICD-10-CM

## 2023-03-02 DIAGNOSIS — R20.0 NUMBNESS AND TINGLING OF HAND: ICD-10-CM

## 2023-03-02 DIAGNOSIS — Z00.00 ENCOUNTER FOR ANNUAL HEALTH EXAMINATION: ICD-10-CM

## 2023-03-02 DIAGNOSIS — Z85.6: ICD-10-CM

## 2023-03-02 DIAGNOSIS — Z00.00 ENCOUNTER FOR ANNUAL HEALTH EXAMINATION: Primary | ICD-10-CM

## 2023-03-02 DIAGNOSIS — N52.9 ERECTILE DYSFUNCTION, UNSPECIFIED ERECTILE DYSFUNCTION TYPE: ICD-10-CM

## 2023-03-02 DIAGNOSIS — Z87.891 FORMER SMOKER: ICD-10-CM

## 2023-03-02 LAB
CHOLEST SERPL-MCNC: 146 MG/DL (ref ?–200)
COMPLEXED PSA SERPL-MCNC: 0.17 NG/ML (ref ?–4)
CREAT UR-SCNC: 55.1 MG/DL
EST. AVERAGE GLUCOSE BLD GHB EST-MCNC: 128 MG/DL (ref 68–126)
FASTING PATIENT LIPID ANSWER: YES
HBA1C MFR BLD: 6.1 % (ref ?–5.7)
HDLC SERPL-MCNC: 64 MG/DL (ref 40–59)
LDLC SERPL CALC-MCNC: 65 MG/DL (ref ?–100)
MICROALBUMIN UR-MCNC: <0.5 MG/DL
NONHDLC SERPL-MCNC: 82 MG/DL (ref ?–130)
TRIGL SERPL-MCNC: 89 MG/DL (ref 30–149)
VIT D+METAB SERPL-MCNC: 99.8 NG/ML (ref 30–100)
VLDLC SERPL CALC-MCNC: 13 MG/DL (ref 0–30)

## 2023-03-02 PROCEDURE — 96160 PT-FOCUSED HLTH RISK ASSMT: CPT | Performed by: FAMILY MEDICINE

## 2023-03-02 PROCEDURE — 83036 HEMOGLOBIN GLYCOSYLATED A1C: CPT

## 2023-03-02 PROCEDURE — 99397 PER PM REEVAL EST PAT 65+ YR: CPT | Performed by: FAMILY MEDICINE

## 2023-03-02 PROCEDURE — G0439 PPPS, SUBSEQ VISIT: HCPCS | Performed by: FAMILY MEDICINE

## 2023-03-02 PROCEDURE — 36415 COLL VENOUS BLD VENIPUNCTURE: CPT

## 2023-03-02 PROCEDURE — 3079F DIAST BP 80-89 MM HG: CPT | Performed by: FAMILY MEDICINE

## 2023-03-02 PROCEDURE — 82570 ASSAY OF URINE CREATININE: CPT

## 2023-03-02 PROCEDURE — 82043 UR ALBUMIN QUANTITATIVE: CPT

## 2023-03-02 PROCEDURE — 3008F BODY MASS INDEX DOCD: CPT | Performed by: FAMILY MEDICINE

## 2023-03-02 PROCEDURE — 82306 VITAMIN D 25 HYDROXY: CPT

## 2023-03-02 PROCEDURE — 3077F SYST BP >= 140 MM HG: CPT | Performed by: FAMILY MEDICINE

## 2023-03-02 PROCEDURE — 1125F AMNT PAIN NOTED PAIN PRSNT: CPT | Performed by: FAMILY MEDICINE

## 2023-03-02 PROCEDURE — 80061 LIPID PANEL: CPT

## 2023-03-02 RX ORDER — AMLODIPINE BESYLATE 5 MG/1
5 TABLET ORAL DAILY
Qty: 90 TABLET | Refills: 0 | Status: SHIPPED | OUTPATIENT
Start: 2023-03-02

## 2023-03-09 ENCOUNTER — OFFICE VISIT (OUTPATIENT)
Facility: CLINIC | Age: 68
End: 2023-03-09
Payer: COMMERCIAL

## 2023-03-09 VITALS
OXYGEN SATURATION: 99 % | BODY MASS INDEX: 29.09 KG/M2 | HEART RATE: 72 BPM | DIASTOLIC BLOOD PRESSURE: 76 MMHG | HEIGHT: 75 IN | WEIGHT: 234 LBS | SYSTOLIC BLOOD PRESSURE: 134 MMHG

## 2023-03-09 DIAGNOSIS — I10 ESSENTIAL HYPERTENSION: Primary | ICD-10-CM

## 2023-03-09 DIAGNOSIS — Z23 NEED FOR VACCINATION: ICD-10-CM

## 2023-03-09 PROCEDURE — 90471 IMMUNIZATION ADMIN: CPT | Performed by: FAMILY MEDICINE

## 2023-03-09 PROCEDURE — 90750 HZV VACC RECOMBINANT IM: CPT | Performed by: FAMILY MEDICINE

## 2023-03-09 PROCEDURE — 99213 OFFICE O/P EST LOW 20 MIN: CPT | Performed by: FAMILY MEDICINE

## 2023-03-09 PROCEDURE — 3078F DIAST BP <80 MM HG: CPT | Performed by: FAMILY MEDICINE

## 2023-03-09 PROCEDURE — 3075F SYST BP GE 130 - 139MM HG: CPT | Performed by: FAMILY MEDICINE

## 2023-03-09 PROCEDURE — 90472 IMMUNIZATION ADMIN EACH ADD: CPT | Performed by: FAMILY MEDICINE

## 2023-03-09 PROCEDURE — 3008F BODY MASS INDEX DOCD: CPT | Performed by: FAMILY MEDICINE

## 2023-03-16 NOTE — TELEPHONE ENCOUNTER
Refill passed per [facility] protocol   Requested Prescriptions   Pending Prescriptions Disp Refills    METFORMIN HCL 1000 MG Oral Tab [Pharmacy Med Name: metFORMIN HCl 1000 MG Oral Tablet] 90 tablet 3     Sig: TAKE 1 TABLET BY MOUTH  DAILY WITH BREAKFAST       Diabetes Medication Protocol Passed - 3/15/2023  9:20 PM        Passed - Last A1C < 7.5 and within past 6 months     Lab Results   Component Value Date    A1C 6.1 (H) 03/02/2023             Passed - In person appointment or virtual visit in the past 6 mos or appointment in next 3 mos     Recent Outpatient Visits              6 days ago Essential hypertension    [address] [facility], [place], [place]    Office Visit    1 week ago Encounter for annual health examination    [address] [facility], [address] Kalpana Sharpe DO    Office Visit    4 months ago CLL (chronic lymphocytic leukemia) [facility] Hematology Oncology Glen Keating MD    Office Visit    5 months ago Numbness and tingling of hand    [facility], [address], [address] [facility]    Office Visit    8 months ago Screen for colon cancer    [address], [address] [facility],[address], [facility]    Nurse Only          Future Appointments         Provider Department Appt Notes    In 2 months Jet Butts 19 Hematology Oncology FOLLOW UP VISIT. CL  6m  \"will have outpt labs\"               Passed - EGFRCR or GFRAA > 50     GFR Evaluation  EGFRCR: 82 , resulted on 11/15/2022          Passed - GFR in the past 12 months

## 2023-05-03 RX ORDER — AMLODIPINE BESYLATE 5 MG/1
5 TABLET ORAL DAILY
Qty: 90 TABLET | Refills: 3 | Status: SHIPPED | OUTPATIENT
Start: 2023-05-03

## 2023-05-03 NOTE — TELEPHONE ENCOUNTER
Refill passed per [employer], [facility] protocol. Requested Prescriptions   Pending Prescriptions Disp Refills    amLODIPine 5 MG Oral Tab [Pharmacy Med Name: amLODIPine Besylate 5 MG Oral Tablet] 90 tablet 3     Sig: Take 1 tablet (5 mg total) by mouth daily. Hypertension Medications Protocol Passed - 5/3/2023  9:25 AM        Passed - CMP or BMP in past 12 months        Passed - Last serum creatinine< 2.0     Lab Results   Component Value Date    CREATSERUM 1.00 11/15/2022                 Passed - Appointment in past 6 or next 3 months              Recent Outpatient Visits              1 month ago Essential hypertension    [address], [facility], [place], [place]    Office Visit    2 months ago Encounter for annual health examination    [address] [facility]    Office Visit    5 months ago CLL (chronic lymphocytic leukemia) [facility] Hematology Oncology Zohra Biswas MD    Office Visit    7 months ago Numbness and tingling of hand    [facility], [facility], [place], [place]    Office Visit    10 months ago Screen for colon cancer    [address], [address] [facility],[address], [place]    Nurse Only            Future Appointments         Provider Department Appt Notes    In 1 week Jet Rodriguez 19 Hematology Oncology FOLLOW UP VISIT. CL  6m  \"will have outpt labs\"

## 2023-05-15 ENCOUNTER — OFFICE VISIT (OUTPATIENT)
Dept: HEMATOLOGY/ONCOLOGY | Facility: HOSPITAL | Age: 68
End: 2023-05-15
Attending: INTERNAL MEDICINE
Payer: MEDICARE

## 2023-05-15 ENCOUNTER — PATIENT MESSAGE (OUTPATIENT)
Facility: CLINIC | Age: 68
End: 2023-05-15

## 2023-05-15 VITALS
TEMPERATURE: 99 F | WEIGHT: 233 LBS | HEIGHT: 75 IN | OXYGEN SATURATION: 100 % | DIASTOLIC BLOOD PRESSURE: 71 MMHG | HEART RATE: 81 BPM | SYSTOLIC BLOOD PRESSURE: 145 MMHG | RESPIRATION RATE: 18 BRPM | BODY MASS INDEX: 28.97 KG/M2

## 2023-05-15 DIAGNOSIS — C91.10 CLL (CHRONIC LYMPHOCYTIC LEUKEMIA) (HCC): Primary | ICD-10-CM

## 2023-05-15 DIAGNOSIS — C91.10 CLL (CHRONIC LYMPHOCYTIC LEUKEMIA) (HCC): ICD-10-CM

## 2023-05-15 LAB
ALBUMIN SERPL-MCNC: 3.4 G/DL (ref 3.4–5)
ALBUMIN/GLOB SERPL: 0.8 {RATIO} (ref 1–2)
ALP LIVER SERPL-CCNC: 55 U/L
ALT SERPL-CCNC: 17 U/L
ANION GAP SERPL CALC-SCNC: 2 MMOL/L (ref 0–18)
AST SERPL-CCNC: 16 U/L (ref 15–37)
BASOPHILS # BLD AUTO: 0.03 X10(3) UL (ref 0–0.2)
BASOPHILS NFR BLD AUTO: 0.2 %
BILIRUB SERPL-MCNC: 0.3 MG/DL (ref 0.1–2)
BUN BLD-MCNC: 11 MG/DL (ref 7–18)
BUN/CREAT SERPL: 12.1 (ref 10–20)
CALCIUM BLD-MCNC: 9 MG/DL (ref 8.5–10.1)
CHLORIDE SERPL-SCNC: 109 MMOL/L (ref 98–112)
CO2 SERPL-SCNC: 27 MMOL/L (ref 21–32)
CREAT BLD-MCNC: 0.91 MG/DL
DEPRECATED RDW RBC AUTO: 48.3 FL (ref 35.1–46.3)
EOSINOPHIL # BLD AUTO: 0.05 X10(3) UL (ref 0–0.7)
EOSINOPHIL NFR BLD AUTO: 0.4 %
ERYTHROCYTE [DISTWIDTH] IN BLOOD BY AUTOMATED COUNT: 14.2 % (ref 11–15)
FASTING STATUS PATIENT QL REPORTED: NO
GFR SERPLBLD BASED ON 1.73 SQ M-ARVRAT: 92 ML/MIN/1.73M2 (ref 60–?)
GLOBULIN PLAS-MCNC: 4.1 G/DL (ref 2.8–4.4)
GLUCOSE BLD-MCNC: 116 MG/DL (ref 70–99)
HCT VFR BLD AUTO: 36.5 %
HGB BLD-MCNC: 12.2 G/DL
IMM GRANULOCYTES # BLD AUTO: 0.01 X10(3) UL (ref 0–1)
IMM GRANULOCYTES NFR BLD: 0.1 %
LDH SERPL L TO P-CCNC: 127 U/L
LYMPHOCYTES # BLD AUTO: 9.98 X10(3) UL (ref 1–4)
LYMPHOCYTES NFR BLD AUTO: 78 %
MCH RBC QN AUTO: 31.1 PG (ref 26–34)
MCHC RBC AUTO-ENTMCNC: 33.4 G/DL (ref 31–37)
MCV RBC AUTO: 93.1 FL
MONOCYTES # BLD AUTO: 0.58 X10(3) UL (ref 0.1–1)
MONOCYTES NFR BLD AUTO: 4.5 %
NEUTROPHILS # BLD AUTO: 2.15 X10 (3) UL (ref 1.5–7.7)
NEUTROPHILS # BLD AUTO: 2.15 X10(3) UL (ref 1.5–7.7)
NEUTROPHILS NFR BLD AUTO: 16.8 %
OSMOLALITY SERPL CALC.SUM OF ELEC: 286 MOSM/KG (ref 275–295)
PLATELET # BLD AUTO: 175 10(3)UL (ref 150–450)
POTASSIUM SERPL-SCNC: 4.1 MMOL/L (ref 3.5–5.1)
PROT SERPL-MCNC: 7.5 G/DL (ref 6.4–8.2)
RBC # BLD AUTO: 3.92 X10(6)UL
SODIUM SERPL-SCNC: 138 MMOL/L (ref 136–145)
WBC # BLD AUTO: 12.8 X10(3) UL (ref 4–11)

## 2023-05-15 PROCEDURE — 83615 LACTATE (LD) (LDH) ENZYME: CPT

## 2023-05-15 PROCEDURE — 85025 COMPLETE CBC W/AUTO DIFF WBC: CPT

## 2023-05-15 PROCEDURE — 36415 COLL VENOUS BLD VENIPUNCTURE: CPT

## 2023-05-15 PROCEDURE — 99214 OFFICE O/P EST MOD 30 MIN: CPT | Performed by: INTERNAL MEDICINE

## 2023-05-15 PROCEDURE — 80053 COMPREHEN METABOLIC PANEL: CPT

## 2023-05-15 NOTE — TELEPHONE ENCOUNTER
From: Viry Phillips  To:  Brando Connolly DO  Sent: 5/15/2023 9:34 AM CDT  Subject: Refill medication     Can you send a refill [facility] for Hydrocortisone Cream.  Thanks

## 2023-05-16 RX ORDER — HYDROCORTISONE 25 MG/G
1 CREAM TOPICAL 2 TIMES DAILY
Qty: 1 EACH | Refills: 3 | Status: SHIPPED | OUTPATIENT
Start: 2023-05-16

## 2023-05-30 ENCOUNTER — TELEPHONE (OUTPATIENT)
Dept: PHYSICAL MEDICINE AND REHAB | Facility: CLINIC | Age: 68
End: 2023-05-30

## 2023-05-30 ENCOUNTER — OFFICE VISIT (OUTPATIENT)
Dept: PHYSICAL MEDICINE AND REHAB | Facility: CLINIC | Age: 68
End: 2023-05-30
Payer: COMMERCIAL

## 2023-05-30 ENCOUNTER — HOSPITAL ENCOUNTER (OUTPATIENT)
Dept: GENERAL RADIOLOGY | Facility: HOSPITAL | Age: 68
Discharge: HOME | End: 2023-05-30
Attending: PHYSICAL MEDICINE & REHABILITATION
Payer: MEDICARE

## 2023-05-30 VITALS
HEART RATE: 111 BPM | SYSTOLIC BLOOD PRESSURE: 152 MMHG | BODY MASS INDEX: 28.97 KG/M2 | OXYGEN SATURATION: 98 % | HEIGHT: 75 IN | DIASTOLIC BLOOD PRESSURE: 68 MMHG | WEIGHT: 233 LBS

## 2023-05-30 DIAGNOSIS — M25.532 WRIST PAIN, ACUTE, LEFT: Primary | ICD-10-CM

## 2023-05-30 DIAGNOSIS — R20.2 PARESTHESIAS IN RIGHT HAND: ICD-10-CM

## 2023-05-30 DIAGNOSIS — M65.4 TENDINITIS, DE QUERVAIN'S: ICD-10-CM

## 2023-05-30 DIAGNOSIS — M25.532 WRIST PAIN, ACUTE, LEFT: ICD-10-CM

## 2023-05-30 PROCEDURE — 3077F SYST BP >= 140 MM HG: CPT | Performed by: PHYSICAL MEDICINE & REHABILITATION

## 2023-05-30 PROCEDURE — 73110 X-RAY EXAM OF WRIST: CPT | Performed by: PHYSICAL MEDICINE & REHABILITATION

## 2023-05-30 PROCEDURE — 1126F AMNT PAIN NOTED NONE PRSNT: CPT | Performed by: PHYSICAL MEDICINE & REHABILITATION

## 2023-05-30 PROCEDURE — 1159F MED LIST DOCD IN RCRD: CPT | Performed by: PHYSICAL MEDICINE & REHABILITATION

## 2023-05-30 PROCEDURE — 3078F DIAST BP <80 MM HG: CPT | Performed by: PHYSICAL MEDICINE & REHABILITATION

## 2023-05-30 PROCEDURE — 3008F BODY MASS INDEX DOCD: CPT | Performed by: PHYSICAL MEDICINE & REHABILITATION

## 2023-05-30 PROCEDURE — 99204 OFFICE O/P NEW MOD 45 MIN: CPT | Performed by: PHYSICAL MEDICINE & REHABILITATION

## 2023-05-30 PROCEDURE — 1160F RVW MEDS BY RX/DR IN RCRD: CPT | Performed by: PHYSICAL MEDICINE & REHABILITATION

## 2023-05-30 NOTE — TELEPHONE ENCOUNTER
Initiated authorization for Left first dorsal compartment steroid injection, ultrasound guidance CPT 22942, , [id number] with [facility]  Status: Notification or Prior Authorization is not required for the requested services valid 5/30/23-8/30/23  This [employer] Medicare Advantage members plan does not currently require a prior authorization for these services. If you have general questions about the prior authorization requirements, please call us at [phone] or visit [email or web address] > Clinician Resources > Advance and Admission Notification Requirements. The number above acknowledges your notification. Please write this number down for future reference. Notification is not a guarantee of coverage or payment.     Decision ID #:[id number]    Patient scheduled 6/6/23 at 12:30p

## 2023-05-30 NOTE — PATIENT INSTRUCTIONS
Anticipated Discharge Disposition: Home with home health and IV infusion    Action: Patient discussed in IDT rounds. The patient will need home antibiotic infusion and home health.     RN CM spoke with patient and spouse at bedside about home infusion services. Choice for: [facility]. Referral with clinical notes faxed to [facility] for review.     Barriers to Discharge: medical clearance, HH acceptance, home infusion acceptance and set up.     Plan: Follow up with medical team and [facility].     Addendum:  Per bedside RN the patient's TPN has been discontinued. Home health updated.      Get xrays of your wrists today as we discussed. I  have ordered the injection we talked about, you can make a follow up appointment with me to schedule this. I would consider use of a thumb spica splint on the left side, in addition to the carpal tunnel brace on the right at night. I have placed a referral to OT/PT that can be used after the injection. You can put Voltaren (Diclofenac) gel on this area 2-3x per day, alternating with ICE in the area for pain relief.

## 2023-06-06 ENCOUNTER — OFFICE VISIT (OUTPATIENT)
Dept: PHYSICAL MEDICINE AND REHAB | Facility: CLINIC | Age: 68
End: 2023-06-06
Payer: COMMERCIAL

## 2023-06-06 DIAGNOSIS — M65.4 TENDINITIS, DE QUERVAIN'S: ICD-10-CM

## 2023-06-06 DIAGNOSIS — M25.532 WRIST PAIN, ACUTE, LEFT: Primary | ICD-10-CM

## 2023-06-06 PROCEDURE — 1160F RVW MEDS BY RX/DR IN RCRD: CPT | Performed by: PHYSICAL MEDICINE & REHABILITATION

## 2023-06-06 PROCEDURE — 76942 ECHO GUIDE FOR BIOPSY: CPT | Performed by: PHYSICAL MEDICINE & REHABILITATION

## 2023-06-06 PROCEDURE — 20550 NJX 1 TENDON SHEATH/LIGAMENT: CPT | Performed by: PHYSICAL MEDICINE & REHABILITATION

## 2023-06-06 PROCEDURE — 1159F MED LIST DOCD IN RCRD: CPT | Performed by: PHYSICAL MEDICINE & REHABILITATION

## 2023-06-06 RX ORDER — LIDOCAINE HYDROCHLORIDE 10 MG/ML
2 INJECTION, SOLUTION INFILTRATION; PERINEURAL ONCE
Status: COMPLETED | OUTPATIENT
Start: 2023-06-06 | End: 2023-06-06

## 2023-06-06 RX ORDER — TRIAMCINOLONE ACETONIDE 40 MG/ML
40 INJECTION, SUSPENSION INTRA-ARTICULAR; INTRAMUSCULAR ONCE
Status: COMPLETED | OUTPATIENT
Start: 2023-06-06 | End: 2023-06-06

## 2023-06-06 NOTE — PROCEDURES
[address] [facility]    CHIEF COMPLAINT:  Patient presents with: Injection: Pt returns to clinic for  Left first dorsal compartment steroid injection, ultrasound guidance     PROCEDURE PERFORMED: Abductor pollicis longus and extensor pollicis brevis tendon sheath injection with ultrasound guidance    INDICATIONS: De Quervain's tenosynovitis    PRIMARY PROCEDURALIST:  Nhung Means DO    [address] [place], [place] [address]vard:   As documented in the Time Out and Pre-Procedure Check Lists. Verbal consent was obtained    PROCEDURE:  The left abductor pollicis longus and extensor pollicis brevis tendons were identified under ultrasound guidance on axial and longitudinal views. Adjacent neurovascular structures were also identified. The skin was cleaned with Betadine swabs x 3 and then anesthetized with ethyl chloride spray. A 30 gauge needle was inserted and under ultrasound guidance was directed to the tendon sheath around the abductor pollicis longus and extensor pollicis brevis tendons. Then aspiration was performed and no blood, fluid, or air was aspirated. The patient was then injected with 3 ml of a mixture of 1 ml of 40 mg of Kenalog/ml and 2 ml of 1% lidocaine without epinephrine. The needle was removed and triple antibiotic ointment and a band aid were applied. The patient tolerated the procedure well. These images are permanently stored in the ultrasound unit or PACS system.       Mauro Riedel DO  Physical Medicine and Rehabilitation / [address]

## 2023-11-25 NOTE — TELEPHONE ENCOUNTER
Review pended refill request as it does not fall under a protocol.     Last Rx: 2/21/23  LOV: 3/9/23    Last Vitamin D level: 99.8 on 3/2/23

## 2023-11-26 RX ORDER — ERGOCALCIFEROL 1.25 MG/1
50000 CAPSULE ORAL WEEKLY
Qty: 15 CAPSULE | Refills: 2 | Status: SHIPPED | OUTPATIENT
Start: 2023-11-26

## 2023-12-24 NOTE — TELEPHONE ENCOUNTER
Failed Protocol/No Protocol.     Requested Prescriptions   Pending Prescriptions Disp Refills    METFORMIN HCL 1000 MG Oral Tab [Pharmacy Med Name: metFORMIN HCl 1000 MG Oral Tablet] 100 tablet 2     Sig: TAKE 1 TABLET BY MOUTH DAILY  WITH BREAKFAST       Diabetes Medication Protocol Failed - 12/22/2023  9:40 PM        Failed - Last A1C < 7.5 and within past 6 months     Lab Results   Component Value Date    A1C 6.1 (H) 03/02/2023             Failed - In person appointment or virtual visit in the past 6 mos or appointment in next 3 mos     Recent Outpatient Visits              6 months ago Wrist pain, acute, left    [facility], [address] [facility], [place], [place]    Office Visit    6 months ago Wrist pain, acute, left    [facility], [address] [facility], [facility]    Office Visit    7 months ago CLL (chronic lymphocytic leukemia) [facility] Hematology Oncology    Nurse Only    7 months ago CLL (chronic lymphocytic leukemia) [facility] Hematology Oncology Rubens Toure MD    Office Visit    9 months ago Essential hypertension    [facility], [address], [facility],     Office Visit                      Passed - EGFRCR or GFRAA > 50     GFR Evaluation  EGFRCR: 92 , resulted on 5/15/2023          Passed - GFR in the past 12 months               Recent Outpatient Visits              6 months ago Wrist pain, acute, left    [facility], [address] [facility], [place], [place]    Office Visit    6 months ago Wrist pain, acute, left    [facility], [address], [place], [place]    Office Visit    7 months ago CLL (chronic lymphocytic leukemia) [facility] Hematology Oncology    Nurse Only    7 months ago CLL (chronic lymphocytic leukemia) [facility] Hematology Oncology Rubens Toure MD    Office Visit    9 months ago Essential hypertension    [facility], [address], [place], [place]    Office Visit

## 2023-12-26 NOTE — TELEPHONE ENCOUNTER
Covering for Dr Haley Gil. Sent in 100 tabs but without refill. Please have patient schedule follow up with Dr Haley Gil as soon as possible for diabetes recheck.      Herberth Calderon MD, 12/26/23, 9:27 AM

## 2024-02-09 RX ORDER — LISINOPRIL 30 MG/1
30 TABLET ORAL DAILY
Qty: 100 TABLET | Refills: 0 | Status: SHIPPED | OUTPATIENT
Start: 2024-02-09

## 2024-02-09 NOTE — TELEPHONE ENCOUNTER
Protocol Failed/ No Protocol    Requested Prescriptions   Pending Prescriptions Disp Refills    LISINOPRIL 30 MG Oral Tab [Pharmacy Med Name: Lisinopril 30 MG Oral Tablet] 100 tablet 2     Sig: TAKE 1 TABLET BY MOUTH DAILY       Hypertension Medications Protocol Failed - 2/7/2024  6:14 AM        Failed - Last BP reading less than 140/90     BP Readings from Last 1 Encounters:   05/30/23 152/68               Passed - CMP or BMP in past 12 months        Passed - In person appointment or virtual visit in the past 12 mos or appointment in next 3 mos     Recent Outpatient Visits              8 months ago [facility]    [facility]    8 months ago Wrist pain, acute, left    [facility]    Office Visit    9 months ago CLL (chronic lymphocytic leukemia) ([facility])    [facility] Hematology Oncology    Nurse Only    9 months ago CLL (chronic lymphocytic leukemia) ([facility])    [facility] Hematology Oncology Birdie Duran MD    Office Visit    11 months ago Essential hypertension    [facility] Khris Fountain DO    Office Visit          Future Appointments         Provider Department Appt Notes    In 1 month Khris Fountain DO [facility] yearly               Passed - EGFRCR or GFRAA > 50     GFR Evaluation  EGFRCR: 92 , resulted on 5/15/2023               Future Appointments         Provider Department Appt Notes    In 1 month Khris Fountain DO [facility] yearly          Recent Outpatient Visits              8 months ago Wrist pain, acute, left    [facility]    Office Visit    8 months ago Wrist pain, acute, left    [facility]     Office Visit    9 months ago CLL (chronic lymphocytic leukemia) ([facility])    [facility] Hematology Oncology    Nurse Only    9 months ago CLL (chronic lymphocytic leukemia) ([facility])    [facility] Hematology Oncology Birdie Duran MD    Office Visit    11 months ago Essential hypertension    [facility] Khris Fountain DO    Office Visit

## 2024-03-04 NOTE — TELEPHONE ENCOUNTER
Please review; protocol failed/No Protocol    LOV: 03/08/2023  Future Appointments   Date Time Provider Department Center   3/28/2024  8:00 AM Khris Fountain DO [facility] FP [facility] OP     No Active/Future labs pended   Requested Prescriptions   Pending Prescriptions Disp Refills    METFORMIN HCL 1000 MG Oral Tab [Pharmacy Med Name: metFORMIN HCl 1000 MG Oral Tablet] 100 tablet 2     Sig: TAKE 1 TABLET BY MOUTH DAILY  WITH BREAKFAST       Diabetes Medication Protocol Failed - 3/1/2024  9:44 PM        Failed - Last A1C < 7.5 and within past 6 months     Lab Results   Component Value Date    A1C 6.1 (H) 03/02/2023             Passed - In person appointment or virtual visit in the past 6 mos or appointment in next 3 mos     Recent Outpatient Visits              9 months ago Wrist pain, acute, left    [facility]    Office Visit    9 months ago Wrist pain, acute, left    [facility] Tay Ames DO    Office Visit    9 months ago CLL (chronic lymphocytic leukemia) ([facility])    [facility] Hematology Oncology    Nurse Only    9 months ago CLL (chronic lymphocytic leukemia) ([facility])    [facility] Hematology Oncology Birdie Duran MD    Office Visit    12 months ago Essential hypertension    [facility] Khris Fountain DO    Office Visit          Future Appointments         Provider Department Appt Notes    In 3 weeks Khris Fountain DO [facility] yearly               Passed - Microalbumin procedure in past 12 months or taking ACE/ARB        Passed - EGFRCR or GFRAA > 50     GFR Evaluation  EGFRCR: 92 , resulted on 5/15/2023          Passed - GFR in the past 12 months           Future Appointments         Provider Department Appt Notes    In 3 weeks Khris Fountain DO [facility] yearly           Recent Outpatient Visits              9 months ago Wrist pain, acute, left    [facility] Tay Ames,     Office Visit    9 months ago Wrist pain, acute, left    [facility],     Office Visit    9 months ago CLL (chronic lymphocytic leukemia) ([facility])    [facility] Hematology Oncology    Nurse Only    9 months ago CLL (chronic lymphocytic leukemia) ([facility])    [facility] Hematology Oncology Birdie Duran MD    Office Visit    12 months ago Essential hypertension    [facility] Khris Fountain DO    Office Visit

## 2024-03-18 ENCOUNTER — HOSPITAL ENCOUNTER (OUTPATIENT)
Age: 69
Discharge: HOME | End: 2024-03-18
Payer: MEDICARE

## 2024-03-18 VITALS
HEART RATE: 103 BPM | RESPIRATION RATE: 18 BRPM | TEMPERATURE: 98 F | SYSTOLIC BLOOD PRESSURE: 183 MMHG | OXYGEN SATURATION: 100 % | DIASTOLIC BLOOD PRESSURE: 94 MMHG

## 2024-03-18 DIAGNOSIS — R30.0 DYSURIA: Primary | ICD-10-CM

## 2024-03-18 DIAGNOSIS — Z20.2 STD EXPOSURE: ICD-10-CM

## 2024-03-18 LAB
BILIRUB UR QL STRIP: NEGATIVE
CLARITY UR: CLEAR
COLOR UR: YELLOW
GLUCOSE UR STRIP-MCNC: NEGATIVE MG/DL
HGB UR QL STRIP: NEGATIVE
KETONES UR STRIP-MCNC: NEGATIVE MG/DL
LEUKOCYTE ESTERASE UR QL STRIP: NEGATIVE
NITRITE UR QL STRIP: NEGATIVE
PH UR STRIP: 5.5 [PH]
SP GR UR STRIP: 1.02
UROBILINOGEN UR STRIP-ACNC: <2 MG/DL

## 2024-03-18 PROCEDURE — 87086 URINE CULTURE/COLONY COUNT: CPT | Performed by: PHYSICIAN ASSISTANT

## 2024-03-18 PROCEDURE — 87591 N.GONORRHOEAE DNA AMP PROB: CPT | Performed by: PHYSICIAN ASSISTANT

## 2024-03-18 PROCEDURE — 87661 TRICHOMONAS VAGINALIS AMPLIF: CPT | Performed by: PHYSICIAN ASSISTANT

## 2024-03-18 PROCEDURE — 87491 CHLMYD TRACH DNA AMP PROBE: CPT | Performed by: PHYSICIAN ASSISTANT

## 2024-03-18 RX ORDER — DOXYCYCLINE HYCLATE 100 MG/1
100 CAPSULE ORAL 2 TIMES DAILY
Qty: 14 CAPSULE | Refills: 0 | Status: SHIPPED | OUTPATIENT
Start: 2024-03-18 | End: 2024-03-25

## 2024-03-18 NOTE — ED INITIAL ASSESSMENT (HPI)
Pt with pelvic discomfort and intermittent penile \"throbbing\" x4 days; denies fever, dysuria, hematuria, discharge; testicular pain or scrotal swelling

## 2024-03-18 NOTE — ED PROVIDER NOTES
Patient Seen in: [facility]      History     Chief Complaint   Patient presents with    Std Screen     Stated Complaint: Check up    Subjective:   HPI    Patient is a 69-year-old male with past medical history of diabetes hypertension that presents to immediate care due to dysuria x 4 days.  Patient states that he had oral intercourse with unknown partner 1 week ago.  Concern for STIs. denies penile lesions, testicular pain, abdominal pain, flank pain hematuria penile discharge    Objective:   Past Medical History:   Diagnosis Date    Diabetes (HCC)     Dyslipidemia     Essential hypertension     Former smoker     15 pack year history and quit in .    High blood pressure     High cholesterol     Hyperlipidemia     Type 2 diabetes mellitus without complication, without long-term current use of insulin (HCC)               Past Surgical History:   Procedure Laterality Date    COLONOSCOPY N/A 2022    Procedure: COLONOSCOPY;  Surgeon: Shreyas Osorio MD;  [facility] ENDO    HERNIA SURGERY      KNEE ARTHROSCOPY                  Social History     Socioeconomic History    Marital status:    Tobacco Use    Smoking status: Former     Packs/day: 0.75     Years: 20.00     Additional pack years: 0.00     Total pack years: 15.00     Types: Cigarettes     Quit date: 2000     Years since quittin.2    Smokeless tobacco: Never    Tobacco comments:     Stop smoking    Vaping Use    Vaping Use: Never used   Substance and Sexual Activity    Alcohol use: Yes     Alcohol/week: 13.0 standard drinks of alcohol     Types: 8 Cans of beer, 5 Shots of liquor per week    Drug use: Never   Other Topics Concern    Caffeine Concern Yes     Comment: 1 cup coffee daily    Exercise Yes     Comment: walking   Social History Narrative    The patient does not use an assistive device..      The patient does live in a home with stairs.              Review of Systems    Positive for stated complaint:  Check up  Other systems are as noted in HPI.  Constitutional and vital signs reviewed.      All other systems reviewed and negative except as noted above.    Physical Exam     ED Triage Vitals [03/18/24 1114]   BP (!) 183/94   Pulse 103   Resp 18   Temp 98.4 °F (36.9 °C)   Temp src Oral   SpO2 100 %   O2 Device None (Room air)       Current:BP (!) 183/94   Pulse 103   Temp 98.4 °F (36.9 °C) (Oral)   Resp 18   SpO2 100%         Physical Exam    Vital signs reviewed. Nursing note reviewed.  Constitutional: Well-developed. Well-nourished. In no acute distress  HENT: Mucous membranes moist.   EYES: No scleral icterus or conjunctival injection.  NECK: Full ROM. Supple.   CARDIAC: Normal rate. Normal S1/ S2. 2+ distal pulses. No edema  PULM/CHEST: Clear to auscultation bilaterally. No wheezes  ABD: Soft, non-tender, non-distended.   : No CVA tenderness.  RECTAL: deferred  Extremities: Full ROM  NEURO: Awake, alert, following commands, moving extremities, answering questions.   SKIN: Warm and dry. No rash or lesions.  PSYCH: Normal judgment. Normal affect.        ED Course     Labs Reviewed   TRICH VAG BY TALAT   URINE CULTURE, ROUTINE   CHLAMYDIA/GONOCOCCUS, TALAT                      MDM      Patient is a 69-year-old male with past medical history of hypertension diabetes that presents to immediate care due to dysuria x 4 days after unprotected oral intercourse 1 week prior with new partner.  Patient arrives hypertensive appearing nontoxic.  Physical exam unremarkable soft abdomen to palpation.  Will obtain urine to evaluate for UTI, STIs including gonorrhea chlamydia trichomonas.  Will treat with IM Rocephin, prescription for doxycycline for 1 week for presumed STI infection.  Less likely pyelonephritis, nephrolithiasis.  Encourage patient to follow-up with PCP in 1 to 2 days.  History given by patient.  Return protocols discussed including worsening pain, testicular pain, lesions.  Patient agreeable to plan all  questions answered.                                   Medical Decision Making      Disposition and Plan     Clinical Impression:  1. Dysuria    2. STD exposure         Disposition:  Discharge  3/18/2024 11:57 am    Follow-up:  Khris Fountain DO  [address] [place] [id number] [phone]    Schedule an appointment as soon as possible for a visit             Medications Prescribed:  Current Discharge Medication List        START taking these medications    Details   doxycycline 100 MG Oral Cap Take 1 capsule (100 mg total) by mouth 2 (two) times daily for 7 days.  Qty: 14 capsule, Refills: 0

## 2024-03-19 LAB
C TRACH DNA SPEC QL NAA+PROBE: NEGATIVE
N GONORRHOEA DNA SPEC QL NAA+PROBE: NEGATIVE

## 2024-03-20 LAB — TRICH VAG NAA: NEGATIVE

## 2024-03-28 ENCOUNTER — LAB ENCOUNTER (OUTPATIENT)
Dept: LAB | Facility: REFERENCE LAB | Age: 69
End: 2024-03-28
Attending: FAMILY MEDICINE
Payer: MEDICARE

## 2024-03-28 ENCOUNTER — OFFICE VISIT (OUTPATIENT)
Facility: CLINIC | Age: 69
End: 2024-03-28
Payer: COMMERCIAL

## 2024-03-28 VITALS
DIASTOLIC BLOOD PRESSURE: 88 MMHG | HEART RATE: 90 BPM | HEIGHT: 75 IN | SYSTOLIC BLOOD PRESSURE: 168 MMHG | OXYGEN SATURATION: 97 % | BODY MASS INDEX: 27.98 KG/M2 | WEIGHT: 225 LBS

## 2024-03-28 DIAGNOSIS — E55.9 VITAMIN D DEFICIENCY: ICD-10-CM

## 2024-03-28 DIAGNOSIS — C91.10 CLL (CHRONIC LYMPHOCYTIC LEUKEMIA) (HCC): ICD-10-CM

## 2024-03-28 DIAGNOSIS — E11.9 TYPE 2 DIABETES MELLITUS WITHOUT COMPLICATION, WITHOUT LONG-TERM CURRENT USE OF INSULIN (HCC): ICD-10-CM

## 2024-03-28 DIAGNOSIS — I10 ESSENTIAL HYPERTENSION: ICD-10-CM

## 2024-03-28 DIAGNOSIS — Z00.00 ENCOUNTER FOR ANNUAL HEALTH EXAMINATION: ICD-10-CM

## 2024-03-28 DIAGNOSIS — Z12.5 PROSTATE CANCER SCREENING: ICD-10-CM

## 2024-03-28 DIAGNOSIS — E78.5 DYSLIPIDEMIA: ICD-10-CM

## 2024-03-28 DIAGNOSIS — Z00.00 ENCOUNTER FOR ANNUAL HEALTH EXAMINATION: Primary | ICD-10-CM

## 2024-03-28 DIAGNOSIS — Z87.891 FORMER SMOKER: ICD-10-CM

## 2024-03-28 DIAGNOSIS — F43.9 STRESS: ICD-10-CM

## 2024-03-28 DIAGNOSIS — N52.9 ERECTILE DYSFUNCTION, UNSPECIFIED ERECTILE DYSFUNCTION TYPE: ICD-10-CM

## 2024-03-28 DIAGNOSIS — C91.10 CLL (CHRONIC LYMPHOCYTIC LEUKEMIA) (HCC): Primary | ICD-10-CM

## 2024-03-28 LAB
ALBUMIN SERPL-MCNC: 4.6 G/DL (ref 3.2–4.8)
ALBUMIN/GLOB SERPL: 1.4 {RATIO} (ref 1–2)
ALP LIVER SERPL-CCNC: 55 U/L
ALT SERPL-CCNC: 7 U/L
ANION GAP SERPL CALC-SCNC: 6 MMOL/L (ref 0–18)
AST SERPL-CCNC: 18 U/L (ref ?–34)
BASOPHILS # BLD AUTO: 0.04 X10(3) UL (ref 0–0.2)
BASOPHILS NFR BLD AUTO: 0.3 %
BILIRUB SERPL-MCNC: 0.5 MG/DL (ref 0.2–1.1)
BUN BLD-MCNC: 12 MG/DL (ref 9–23)
BUN/CREAT SERPL: 12.5 (ref 10–20)
CALCIUM BLD-MCNC: 10 MG/DL (ref 8.7–10.4)
CHLORIDE SERPL-SCNC: 105 MMOL/L (ref 98–112)
CHOLEST SERPL-MCNC: 180 MG/DL (ref ?–200)
CO2 SERPL-SCNC: 29 MMOL/L (ref 21–32)
COMPLEXED PSA SERPL-MCNC: 0.22 NG/ML (ref ?–4)
CREAT BLD-MCNC: 0.96 MG/DL
CREAT UR-SCNC: 122.6 MG/DL
DEPRECATED RDW RBC AUTO: 49.8 FL (ref 35.1–46.3)
EGFRCR SERPLBLD CKD-EPI 2021: 86 ML/MIN/1.73M2 (ref 60–?)
EOSINOPHIL # BLD AUTO: 0.09 X10(3) UL (ref 0–0.7)
EOSINOPHIL NFR BLD AUTO: 0.7 %
ERYTHROCYTE [DISTWIDTH] IN BLOOD BY AUTOMATED COUNT: 14.6 % (ref 11–15)
EST. AVERAGE GLUCOSE BLD GHB EST-MCNC: 114 MG/DL (ref 68–126)
FASTING PATIENT LIPID ANSWER: YES
FASTING STATUS PATIENT QL REPORTED: YES
GLOBULIN PLAS-MCNC: 3.2 G/DL (ref 2.8–4.4)
GLUCOSE BLD-MCNC: 99 MG/DL (ref 70–99)
HBA1C MFR BLD: 5.6 % (ref ?–5.7)
HCT VFR BLD AUTO: 40.6 %
HDLC SERPL-MCNC: 80 MG/DL (ref 40–59)
HGB BLD-MCNC: 14.1 G/DL
IMM GRANULOCYTES # BLD AUTO: 0.01 X10(3) UL (ref 0–1)
IMM GRANULOCYTES NFR BLD: 0.1 %
LDLC SERPL CALC-MCNC: 86 MG/DL (ref ?–100)
LYMPHOCYTES # BLD AUTO: 10.77 X10(3) UL (ref 1–4)
LYMPHOCYTES NFR BLD AUTO: 80.3 %
MCH RBC QN AUTO: 32.1 PG (ref 26–34)
MCHC RBC AUTO-ENTMCNC: 34.7 G/DL (ref 31–37)
MCV RBC AUTO: 92.5 FL
MICROALBUMIN UR-MCNC: 0.7 MG/DL
MICROALBUMIN/CREAT 24H UR-RTO: 5.7 UG/MG (ref ?–30)
MONOCYTES # BLD AUTO: 0.57 X10(3) UL (ref 0.1–1)
MONOCYTES NFR BLD AUTO: 4.3 %
NEUTROPHILS # BLD AUTO: 1.93 X10 (3) UL (ref 1.5–7.7)
NEUTROPHILS # BLD AUTO: 1.93 X10(3) UL (ref 1.5–7.7)
NEUTROPHILS NFR BLD AUTO: 14.3 %
NONHDLC SERPL-MCNC: 100 MG/DL (ref ?–130)
OSMOLALITY SERPL CALC.SUM OF ELEC: 290 MOSM/KG (ref 275–295)
PLATELET # BLD AUTO: 182 10(3)UL (ref 150–450)
POTASSIUM SERPL-SCNC: 3.9 MMOL/L (ref 3.5–5.1)
PROT SERPL-MCNC: 7.8 G/DL (ref 5.7–8.2)
RBC # BLD AUTO: 4.39 X10(6)UL
SODIUM SERPL-SCNC: 140 MMOL/L (ref 136–145)
TRIGL SERPL-MCNC: 76 MG/DL (ref 30–149)
VIT D+METAB SERPL-MCNC: 88.6 NG/ML (ref 30–100)
VLDLC SERPL CALC-MCNC: 12 MG/DL (ref 0–30)
WBC # BLD AUTO: 13.4 X10(3) UL (ref 4–11)

## 2024-03-28 PROCEDURE — 3077F SYST BP >= 140 MM HG: CPT | Performed by: FAMILY MEDICINE

## 2024-03-28 PROCEDURE — 1170F FXNL STATUS ASSESSED: CPT | Performed by: FAMILY MEDICINE

## 2024-03-28 PROCEDURE — 3044F HG A1C LEVEL LT 7.0%: CPT | Performed by: FAMILY MEDICINE

## 2024-03-28 PROCEDURE — G0439 PPPS, SUBSEQ VISIT: HCPCS | Performed by: FAMILY MEDICINE

## 2024-03-28 PROCEDURE — 83036 HEMOGLOBIN GLYCOSYLATED A1C: CPT

## 2024-03-28 PROCEDURE — 3061F NEG MICROALBUMINURIA REV: CPT | Performed by: FAMILY MEDICINE

## 2024-03-28 PROCEDURE — 96160 PT-FOCUSED HLTH RISK ASSMT: CPT | Performed by: FAMILY MEDICINE

## 2024-03-28 PROCEDURE — 1159F MED LIST DOCD IN RCRD: CPT | Performed by: FAMILY MEDICINE

## 2024-03-28 PROCEDURE — 82570 ASSAY OF URINE CREATININE: CPT

## 2024-03-28 PROCEDURE — 85060 BLOOD SMEAR INTERPRETATION: CPT

## 2024-03-28 PROCEDURE — 3008F BODY MASS INDEX DOCD: CPT | Performed by: FAMILY MEDICINE

## 2024-03-28 PROCEDURE — 99214 OFFICE O/P EST MOD 30 MIN: CPT | Performed by: FAMILY MEDICINE

## 2024-03-28 PROCEDURE — 3079F DIAST BP 80-89 MM HG: CPT | Performed by: FAMILY MEDICINE

## 2024-03-28 PROCEDURE — 82306 VITAMIN D 25 HYDROXY: CPT

## 2024-03-28 PROCEDURE — 82043 UR ALBUMIN QUANTITATIVE: CPT

## 2024-03-28 PROCEDURE — 80053 COMPREHEN METABOLIC PANEL: CPT

## 2024-03-28 PROCEDURE — 36415 COLL VENOUS BLD VENIPUNCTURE: CPT

## 2024-03-28 PROCEDURE — 85025 COMPLETE CBC W/AUTO DIFF WBC: CPT

## 2024-03-28 PROCEDURE — 1160F RVW MEDS BY RX/DR IN RCRD: CPT | Performed by: FAMILY MEDICINE

## 2024-03-28 PROCEDURE — 1126F AMNT PAIN NOTED NONE PRSNT: CPT | Performed by: FAMILY MEDICINE

## 2024-03-28 PROCEDURE — 80061 LIPID PANEL: CPT

## 2024-03-28 NOTE — PROGRESS NOTES
Subjective:   Johann Milner is a 69 year old male who presents for a MA (Medicare Advantage) Supervisit (Once per calendar year) and scheduled follow up of multiple significant but stable problems.     HTN: Taking lisinopril and amlodipine w/o issues. Does not check home BP regularly more recently. Notes that he's been very stressed recently and not sleeping well. Sleeping for 3-4 hours per day. Coping with stress by drinking about 1 pint of vodka daily.      HLD: Taking simvastatin w/o issues.       DM: Taking metformin 1000mg daily w/o issues.      Vit D deficiency: Taking 50K units once weekly.      Diet & Exercise: Lots of walking. Diet is okay. Member at [employer] but going less frequently. Going 2x/month.   Last colonoscopy: 09/2022 with Dr. Osorio showing one polyp, diverticulosis, and hemorrhoids. Rec'd repeat in 5 years.   PSA: normal in 03/2023  Hx of tobacco use. 15 pack year history and quit in 2000.   AAA screening: Negative in 04/2021  Vaccines: UTD    History/Other:   Fall Risk Assessment:   He has been screened for Falls and is low risk.      Cognitive Assessment:   He had a completely normal cognitive assessment - see flowsheet entries     Functional Ability/Status:   Johann Milner has a completely normal functional assessment. See flowsheet for details.        Depression Screening (PHQ-2/PHQ-9): PHQ-2 SCORE: 0  , done 3/21/2024             Advanced Directives:   He does NOT have a Living Will. [Do you have a living will?: No]  He does NOT have a Power of  for Health Care. [Do you have a healthcare power of ?: No]  Not discussed      Patient Active Problem List   Diagnosis    Dyslipidemia    Type 2 diabetes mellitus without complication, without long-term current use of insulin (HCC)    Essential hypertension    Erectile dysfunction    Former smoker    Vitamin D deficiency    CLL (chronic lymphocytic leukemia) ([facility])     Allergies:  He has No Known Allergies.    Current  Medications:  Outpatient Medications Marked as Taking for the 3/28/24 encounter (Office Visit) with Khris Fountain DO   Medication Sig    metFORMIN HCl 1000 MG Oral Tab Take 1 tablet (1,000 mg total) by mouth daily with breakfast.    lisinopril 30 MG Oral Tab Take 1 tablet (30 mg total) by mouth daily.    ergocalciferol 1.25 MG (24709 UT) Oral Cap Take 1 capsule (50,000 Units total) by mouth once a week.    hydrocortisone (PROCTOSOL HC) 2.5 % External Cream Place 1 Application rectally 2 (two) times daily.    simvastatin 40 MG Oral Tab Take 1 tablet (40 mg total) by mouth nightly.    amLODIPine 5 MG Oral Tab Take 1 tablet (5 mg total) by mouth daily.       Medical History:  He  has a past medical history of Diabetes ([facility]), Dyslipidemia, Essential hypertension, Former smoker (), High blood pressure, High cholesterol, Hyperlipidemia (), and Type 2 diabetes mellitus without complication, without long-term current use of insulin ([facility]).  Surgical History:  He  has a past surgical history that includes hernia surgery; knee arthroscopy; and colonoscopy (N/A, 2022).   Family History:  His family history includes Cancer in his father and maternal grandmother; Genetic Disease in his mother; Heart Disease in his mother.  Social History:  He  reports that he quit smoking about 24 years ago. His smoking use included cigarettes. He has a 15 pack-year smoking history. He has never used smokeless tobacco. He reports current alcohol use of about 13.0 standard drinks of alcohol per week. He reports that he does not use drugs.    Tobacco:  He smoked tobacco in the past but quit greater than 12 months ago.  Social History    Tobacco Use      Smoking status: Former        Packs/day: 0.75        Years: 20.00        Additional pack years: 0.00        Total pack years: 15.00        Types: Cigarettes        Quit date: 2000        Years since quittin.2      Smokeless tobacco: Never      Tobacco comments: Stop smoking  2001         CAGE Alcohol Screen:   CAGE screening score of 0 on 3/21/2024, showing low risk of alcohol abuse.      Patient Care Team:  Khris Fountain DO as PCP - General (Family Medicine)    Review of Systems     Negative except HPI    Objective:   Physical Exam  General Appearance:  Alert, cooperative, no distress, appears stated age   Head:  Normocephalic, without obvious abnormality, atraumatic   Eyes:  PERRL, conjunctiva/corneas clear, EOM's intact, both eyes   Ears:  Normal TM's and external ear canals, both ears   Nose: Nares normal, septum midline, mucosa normal, no drainage or sinus tenderness   Throat: Lips, mucosa, and tongue normal; teeth and gums normal   Neck: Supple, symmetrical, trachea midline, no adenopathy, thyroid: not enlarged, symmetric, no tenderness/mass/nodules, no carotid bruit or JVD   Back:   Symmetric, no curvature, ROM normal, no CVA tenderness   Lungs:   Clear to auscultation bilaterally, respirations unlabored   Chest Wall:  No tenderness or deformity   Heart:  Regular rate and rhythm, S1, S2 normal, no murmur, rub or gallop   Abdomen:   Soft, non-tender, bowel sounds active all four quadrants,  no masses, no organomegaly           Extremities: Extremities normal, atraumatic, no cyanosis or edema   Pulses: 2+ and symmetric   Skin: Skin color, texture, turgor normal, no rashes or lesions   Lymph nodes: Cervical, supraclavicular, and axillary nodes normal   Neurologic: Normal     BP (!) 168/88   Pulse 90   Ht 6' 3\" (1.905 m)   Wt 225 lb (102.1 kg)   SpO2 97%   BMI 28.12 kg/m²  Estimated body mass index is 28.12 kg/m² as calculated from the following:    Height as of this encounter: 6' 3\" (1.905 m).    Weight as of this encounter: 225 lb (102.1 kg).    Medicare Hearing Assessment:   Hearing Screening    Time taken: 3/28/2024  7:48 AM  Screening Method: Finger Rub  Finger Rub Result: Pass         Visual Acuity:   Right Eye Visual Acuity: Uncorrected Right Eye Chart Acuity: 20/40    Left Eye Visual Acuity: Uncorrected Left Eye Chart Acuity: 20/50   Both Eyes Visual Acuity: Uncorrected Both Eyes Chart Acuity: 20/40   Able To Tolerate Visual Acuity: Yes        Assessment & Plan:   Johann Milner is a 69 year old male who presents for a Medicare Assessment.     1. Encounter for annual health examination (Primary)  -     Hemoglobin A1C; Future; Expected date: 03/28/2024  -     CBC With Differential With Platelet; Future; Expected date: 03/28/2024  -     Comp Metabolic Panel (14); Future; Expected date: 03/28/2024  -     Lipid Panel; Future; Expected date: 03/28/2024  -     PSA Total, Screen; Future; Expected date: 03/28/2024  -     Vitamin D; Future; Expected date: 03/28/2024  2. Essential hypertension  -     Comp Metabolic Panel (14); Future; Expected date: 03/28/2024  3. Type 2 diabetes mellitus without complication, without long-term current use of insulin (HCC)  -     Hemoglobin A1C; Future; Expected date: 03/28/2024  -     Comp Metabolic Panel (14); Future; Expected date: 03/28/2024  -     Microalb/Creat Ratio, Random Urine; Future; Expected date: 03/28/2024  4. CLL (chronic lymphocytic leukemia) ([facility])  5. Dyslipidemia  -     Lipid Panel; Future; Expected date: 03/28/2024  6. Vitamin D deficiency  -     Vitamin D; Future; Expected date: 03/28/2024  7. Erectile dysfunction, unspecified erectile dysfunction type  8. Former smoker  9. Prostate cancer screening  -     PSA Total, Screen; Future; Expected date: 03/28/2024  10. Stress    -Uncontrolled HTN: Likely 2/2 stress, insomnia, and etoh intake. Offered [facility] referral but declines for now. Urged to limit/avoid etoh. Increase amlodipine to 10mg daily. Continue lisinopril. RTC in 4 weeks for f/u.  -DM: CPM. F/u labs.  -HLD: On statin. F/u labs.   -CLL: Sees hematology.   -Vit D defic: Check level.  -ED: No current concerns.   -Stress: See above.  -Annual labs ordered.     The patient indicates understanding of these issues and agrees to the  plan.  Lab work ordered.  Reinforced healthy diet, lifestyle, and exercise.      No follow-ups on file.     Khris Fountain DO, 3/28/2024     Supplementary Documentation:   General Health:  In the past six months, have you lost more than 10 pounds without trying?: 2 - No  Has your appetite been poor?: Yes  Type of Diet: Other  How does the patient maintain a good energy level?: Daily Walks  How would you describe your daily physical activity?: Moderate  How would you describe your current health state?: Good  How do you maintain positive mental well-being?: Social Interaction  On a scale of 0 to 10, with 0 being no pain and 10 being severe pain, what is your pain level?: 0 - (None)  In the past six months, have you experienced urine leakage?: 0-No  At any time do you feel concerned for the safety/well-being of yourself and/or your children, in your home or elsewhere?: No  Have you had any immunizations at another office such as Influenza, Hepatitis B, Tetanus, or Pneumococcal?: No          Johann Milner's SCREENING SCHEDULE   Tests on this list are recommended by your physician but may not be covered, or covered at this frequency, by your insurer.   Please check with your insurance carrier before scheduling to verify coverage.   PREVENTATIVE SERVICES FREQUENCY &  COVERAGE DETAILS LAST COMPLETION DATE   Diabetes Screening    Fasting Blood Sugar / Glucose    One screening every 12 months if never tested or if previously tested but not diagnosed with pre-diabetes   One screening every 6 months if diagnosed with pre-diabetes Lab Results   Component Value Date    GLU 99 03/28/2024        Cardiovascular Disease Screening    Lipid Panel  Cholesterol  Lipoprotein (HDL)  Triglycerides Covered every 5 years for all Medicare beneficiaries without apparent signs or symptoms of cardiovascular disease Lab Results   Component Value Date    CHOLEST 180 03/28/2024    HDL 80 (H) 03/28/2024    LDL 86 03/28/2024    TRIG 76  03/28/2024         Electrocardiogram (EKG)   Covered if needed at Welcome to Medicare, and non-screening if indicated for medical reasons 01/23/2021      Ultrasound Screening for Abdominal Aortic Aneurysm (AAA) Covered once in a lifetime for one of the following risk factors    Men who are 65-75 years old and have ever smoked    Anyone with a family history 04/21/2021     Colorectal Cancer Screening  Covered for ages 50-85; only need ONE of the following:    Colonoscopy   Covered every 10 years    Covered every 2 years if patient is at high risk or previous colonoscopy was abnormal 09/21/2022    Health Maintenance   Topic Date Due    Colorectal Cancer Screening  09/21/2027       Flexible Sigmoidoscopy   Covered every 4 years -    Fecal Occult Blood Test Covered annually -   Prostate Cancer Screening    Prostate-Specific Antigen (PSA) Annually No results found for: \"PSA\"  Health Maintenance   Topic Date Due    PSA  03/28/2026      Immunizations    Influenza Covered once per flu season  Please get every year -  Influenza Vaccine(1) due on 10/01/2023    Pneumococcal Each vaccine ([id number] & [id number]) covered once after 65 Prevnar 13: 04/06/2021    [id number]: 04/07/2022     No recommendations at this time    Hepatitis B One screening covered for patients with certain risk factors   -  No recommendations at this time    Tetanus Toxoid Not covered by Medicare Part B unless medically necessary (cut with metal); may be covered with your pharmacy prescription benefits -    Tetanus, Diptheria and Pertusis TD and TDaP Not covered by Medicare Part B -  No recommendations at this time    Zoster Not covered by Medicare Part B; may be covered with your pharmacy  prescription benefits -  No recommendations at this time     Diabetes      Hemoglobin A1C Annually; if last result is elevated, may be asked to retest more frequently.    Medicare covers every 3 months Lab Results   Component Value Date     03/28/2024    A1C  5.6 03/28/2024       No recommendations at this time    Creat/alb ratio Annually Lab Results   Component Value Date    MICROALBCREA 5.7 03/28/2024       LDL Annually Lab Results   Component Value Date    LDL 86 03/28/2024       Dilated Eye Exam Annually Last Diabetic Eye Exam:  No data recorded  No data recorded       Annual Monitoring of Persistent Medications (ACE/ARB, digoxin diuretics, anticonvulsants)    Potassium Annually Lab Results   Component Value Date    K 3.9 03/28/2024         Creatinine   Annually Lab Results   Component Value Date    CREATSERUM 0.96 03/28/2024         BUN Annually Lab Results   Component Value Date    BUN 12 03/28/2024       Drug Serum Conc Annually No results found for: \"DIGOXIN\", \"DIG\", \"VALP\"

## 2024-03-28 NOTE — PATIENT INSTRUCTIONS
Johann Milner's SCREENING SCHEDULE   Tests on this list are recommended by your physician but may not be covered, or covered at this frequency, by your insurer.   Please check with your insurance carrier before scheduling to verify coverage.   PREVENTATIVE SERVICES FREQUENCY &  COVERAGE DETAILS LAST COMPLETION DATE   Diabetes Screening    Fasting Blood Sugar / Glucose    One screening every 12 months if never tested or if previously tested but not diagnosed with pre-diabetes   One screening every 6 months if diagnosed with pre-diabetes Lab Results   Component Value Date     (H) 05/15/2023        Cardiovascular Disease Screening    Lipid Panel  Cholesterol  Lipoprotein (HDL)  Triglycerides Covered every 5 years for all Medicare beneficiaries without apparent signs or symptoms of cardiovascular disease Lab Results   Component Value Date    CHOLEST 146 03/02/2023    HDL 64 (H) 03/02/2023    LDL 65 03/02/2023    TRIG 89 03/02/2023         Electrocardiogram (EKG)   Covered if needed at Welcome to Medicare, and non-screening if indicated for medical reasons 01/23/2021      Ultrasound Screening for Abdominal Aortic Aneurysm (AAA) Covered once in a lifetime for one of the following risk factors   • Men who are 65-75 years old and have ever smoked   • Anyone with a family history 04/21/2021     Colorectal Cancer Screening  Covered for ages 50-85; only need ONE of the following:    Colonoscopy   Covered every 10 years    Covered every 2 years if patient is at high risk or previous colonoscopy was abnormal 09/21/2022    Health Maintenance   Topic Date Due   • Colorectal Cancer Screening  09/21/2027       Flexible Sigmoidoscopy   Covered every 4 years -    Fecal Occult Blood Test Covered annually -   Prostate Cancer Screening    Prostate-Specific Antigen (PSA) Annually No results found for: \"PSA\"  Health Maintenance   Topic Date Due   • PSA  03/02/2025      Immunizations    Influenza Covered once per flu  season  Please get every year -  Influenza Vaccine(1) due on 10/01/2023    Pneumococcal Each vaccine ([id number] & Hgssllglb16) covered once after 65 Prevnar 13: 04/06/2021    [id number]: 04/07/2022     No recommendations at this time    Hepatitis B One screening covered for patients with certain risk factors   -  No recommendations at this time    Tetanus Toxoid Not covered by Medicare Part B unless medically necessary (cut with metal); may be covered with your pharmacy prescription benefits -    Tetanus, Diptheria and Pertusis TD and TDaP Not covered by Medicare Part B -  No recommendations at this time    Zoster Not covered by Medicare Part B; may be covered with your pharmacy  prescription benefits -  No recommendations at this time     Diabetes      Hemoglobin A1C Annually; if last result is elevated, may be asked to retest more frequently.    Medicare covers every 3 months Lab Results   Component Value Date     (H) 03/02/2023    A1C 6.1 (H) 03/02/2023       Hemoglobin A1C Test due on 09/02/2023    Creat/alb ratio Annually Lab Results   Component Value Date    MICROALBCREA  03/02/2023      Comment:      Unable to calculate due to Urine Microalbumin <0.5 mg/dL         LDL Annually Lab Results   Component Value Date    LDL 65 03/02/2023       Dilated Eye Exam Annually [unfilled]     Annual Monitoring of Persistent Medications (ACE/ARB, digoxin diuretics, anticonvulsants)    Potassium Annually Lab Results   Component Value Date    K 4.1 05/15/2023         Creatinine   Annually Lab Results   Component Value Date    CREATSERUM 0.91 05/15/2023         BUN Annually Lab Results   Component Value Date    BUN 11 05/15/2023       Drug Serum Conc Annually No results found for: \"DIGOXIN\", \"DIG\", \"VALP\"

## 2024-03-29 RX ORDER — AMLODIPINE BESYLATE 5 MG/1
5 TABLET ORAL DAILY
Qty: 90 TABLET | Refills: 3 | Status: CANCELLED | OUTPATIENT
Start: 2024-03-29

## 2024-03-30 DIAGNOSIS — Z11.3 SCREEN FOR STD (SEXUALLY TRANSMITTED DISEASE): Primary | ICD-10-CM

## 2024-03-30 RX ORDER — AMLODIPINE BESYLATE 5 MG/1
5 TABLET ORAL DAILY
Qty: 90 TABLET | Refills: 3 | Status: SHIPPED | OUTPATIENT
Start: 2024-03-30 | End: 2024-04-01

## 2024-04-01 RX ORDER — AMLODIPINE BESYLATE 5 MG/1
5 TABLET ORAL DAILY
Qty: 90 TABLET | Refills: 3 | Status: SHIPPED | OUTPATIENT
Start: 2024-04-01

## 2024-04-01 NOTE — TELEPHONE ENCOUNTER
Please review.  Protocol failed / Has no protocol.    Rx recently sent to local pharmacy. Patient is requesting [facility] mailorder. Thank you     Requested Prescriptions   Pending Prescriptions Disp Refills    amLODIPine 5 MG Oral Tab 90 tablet 3     Sig: Take 1 tablet (5 mg total) by mouth daily.       Hypertension Medications Protocol Failed - 4/1/2024  9:41 AM        Failed - Last BP reading less than 140/90     BP Readings from Last 1 Encounters:   03/28/24 (!) 168/88               Passed - CMP or BMP in past 12 months        Passed - In person appointment or virtual visit in the past 12 mos or appointment in next 3 mos     Recent Outpatient Visits              4 days ago Encounter for annual health examination    [facility] Khris Fountain DO    Office Visit    10 months ago Wrist pain, acute, left    [facility] [place] Tay Ames DO    Office Visit    10 months ago Wrist pain, acute, left    [facility] [place] Tay Ames DO    Office Visit    10 months ago CLL (chronic lymphocytic leukemia) ([facility])    [facility]    Nurse Only    10 months ago CLL (chronic lymphocytic leukemia) ([facility])    [facility] Birdie Duran MD    Office Visit          Future Appointments         Provider Department Appt Notes    In 3 weeks Khris Fountain DO [facility] [place] per Dr. Fountain               Passed - EGFRCR or GFRAA > 50     GFR Evaluation  EGFRCR: 86 , resulted on 3/28/2024

## 2024-04-01 NOTE — TELEPHONE ENCOUNTER
Johann HENSON [facility] Refills3 days ago     CK  Refills have been requested for the following medications:         amLODIPine 5 MG Oral Tab [Khris Fountain]     Preferred pharmacy: [facility] - [address] [phone], [phone]         Patient requesting new rx sent to mailorder

## 2024-04-02 ENCOUNTER — LAB ENCOUNTER (OUTPATIENT)
Dept: LAB | Facility: REFERENCE LAB | Age: 69
End: 2024-04-02
Attending: FAMILY MEDICINE
Payer: MEDICARE

## 2024-04-02 DIAGNOSIS — Z11.3 SCREEN FOR STD (SEXUALLY TRANSMITTED DISEASE): ICD-10-CM

## 2024-04-02 LAB
HBV SURFACE AG SER-ACNC: <0.1 [IU]/L
HBV SURFACE AG SERPL QL IA: NONREACTIVE
T PALLIDUM AB SER QL IA: NONREACTIVE

## 2024-04-02 PROCEDURE — 87591 N.GONORRHOEAE DNA AMP PROB: CPT

## 2024-04-02 PROCEDURE — 87491 CHLMYD TRACH DNA AMP PROBE: CPT

## 2024-04-02 PROCEDURE — 87340 HEPATITIS B SURFACE AG IA: CPT

## 2024-04-02 PROCEDURE — 36415 COLL VENOUS BLD VENIPUNCTURE: CPT

## 2024-04-02 PROCEDURE — 86780 TREPONEMA PALLIDUM: CPT

## 2024-04-02 PROCEDURE — 87389 HIV-1 AG W/HIV-1&-2 AB AG IA: CPT

## 2024-04-03 LAB
C TRACH DNA SPEC QL NAA+PROBE: NEGATIVE
N GONORRHOEA DNA SPEC QL NAA+PROBE: NEGATIVE

## 2024-04-11 RX ORDER — SIMVASTATIN 40 MG
40 TABLET ORAL NIGHTLY
Qty: 90 TABLET | Refills: 3 | Status: SHIPPED | OUTPATIENT
Start: 2024-04-11

## 2024-04-11 NOTE — TELEPHONE ENCOUNTER
Refill Per Protocol     Requested Prescriptions   Pending Prescriptions Disp Refills    SIMVASTATIN 40 MG Oral Tab [Pharmacy Med Name: Simvastatin 40 MG Oral Tablet] 100 tablet 2     Sig: TAKE 1 TABLET BY MOUTH AT NIGHT       Cholesterol Medication Protocol Passed - 4/9/2024  9:18 PM        Passed - ALT < 80     Lab Results   Component Value Date    ALT 7 (L) 03/28/2024             Passed - ALT resulted within past year        Passed - Lipid panel within past 12 months     Lab Results   Component Value Date    CHOLEST 180 03/28/2024    TRIG 76 03/28/2024    HDL 80 (H) 03/28/2024    LDL 86 03/28/2024    VLDL 12 03/28/2024    NONHDLC 100 03/28/2024             Passed - In person appointment or virtual visit in the past 12 mos or appointment in next 3 mos     Recent Outpatient Visits              2 weeks ago Encounter for annual health examination    [facility] Khris Fountain DO    Office Visit    10 months ago Wrist pain, acute, left    [facility] Tay Ames DO    Office Visit    10 months ago [facility]    [facility]    11 months ago CLL (chronic lymphocytic leukemia) ([facility])    [facility]    Nurse Only    11 months ago CLL (chronic lymphocytic leukemia) ([facility])    [facility] Birdie Duran MD    Office Visit          Future Appointments         Provider Department Appt Notes    In 5 days Jhon Palacio MD [facility] Oncology former patient of  Dr. Duran    In 2 weeks Khris Fountain DO [facility] [place] per Dr. Fountain                           Future Appointments         Provider Department Appt Notes    In 5 days Jhon Palacio MD [facility] Oncology former patient of  Dr. Duran    In 2 weeks Khris Fountain DO  [facility] [place] [place] per [place]. Fountain          Recent Outpatient Visits              2 weeks ago Encounter for annual health examination    [facility] [place] Khris Fountain,     Office Visit    10 months [facility]    Office Visit    10 months ago Wrist pain, acute, left    [facility] Tay Salazar DO    Office Visit    11 months ago CLL (chronic lymphocytic leukemia) ([facility])    [facility]    Nurse Only    11 months ago CLL (chronic lymphocytic leukemia) ([facility])    [facility] Birdie Duran MD    Office Visit

## 2024-04-16 ENCOUNTER — OFFICE VISIT (OUTPATIENT)
Dept: HEMATOLOGY/ONCOLOGY | Facility: HOSPITAL | Age: 69
End: 2024-04-16
Attending: INTERNAL MEDICINE
Payer: MEDICARE

## 2024-04-16 VITALS
BODY MASS INDEX: 27.23 KG/M2 | HEART RATE: 108 BPM | OXYGEN SATURATION: 99 % | SYSTOLIC BLOOD PRESSURE: 146 MMHG | HEIGHT: 75 IN | DIASTOLIC BLOOD PRESSURE: 73 MMHG | WEIGHT: 219 LBS | TEMPERATURE: 98 F | RESPIRATION RATE: 18 BRPM

## 2024-04-16 DIAGNOSIS — R63.4 UNINTENTIONAL WEIGHT LOSS: ICD-10-CM

## 2024-04-16 DIAGNOSIS — Z87.39 HISTORY OF GOUT: ICD-10-CM

## 2024-04-16 DIAGNOSIS — R53.83 OTHER FATIGUE: ICD-10-CM

## 2024-04-16 DIAGNOSIS — R68.81 EARLY SATIETY: ICD-10-CM

## 2024-04-16 DIAGNOSIS — C91.10 CLL (CHRONIC LYMPHOCYTIC LEUKEMIA) (HCC): Primary | ICD-10-CM

## 2024-04-16 PROCEDURE — 99214 OFFICE O/P EST MOD 30 MIN: CPT | Performed by: INTERNAL MEDICINE

## 2024-04-16 NOTE — PROGRESS NOTES
Cancer Center Progress Note    Patient Name: Johann Milner   YOB: 1955   Medical Record Number: [id number]     Chief Complaint:  CLL     Oncology History:  69 year old seen prior history of CLL (2017) with normal WBC, lost to follow up, presenting with elevated WBC and ALC consistent with CLL, Normal FISH    4/16/2024  Here for follow up of CLL. Johann reports increased fatigue and decreased appetite+unintentional weight loss. 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 Patient mentions early satiety over the last 8-9 months. Pt has the absence of any recurrent fevers/infections, chills, NS, or lymphadenopathy. No other concerns on ROS.    PMH: CLL, HTN, DM  Family History: three sisters with breast cancer. father with bone cancer.   Social History: lives with wife, 3 kids. Quit> 20 years. retired from  at     Current Outpatient Medications:     simvastatin 40 MG Oral Tab, Take 1 tablet (40 mg total) by mouth nightly., Disp: 90 tablet, Rfl: 3    amLODIPine 5 MG Oral Tab, Take 1 tablet (5 mg total) by mouth daily., Disp: 90 tablet, Rfl: 3    metFORMIN HCl 1000 MG Oral Tab, Take 1 tablet (1,000 mg total) by mouth daily with breakfast., Disp: 90 tablet, Rfl: 0    lisinopril 30 MG Oral Tab, Take 1 tablet (30 mg total) by mouth daily., Disp: 100 tablet, Rfl: 0    ergocalciferol 1.25 MG (97199 UT) Oral Cap, Take 1 capsule (50,000 Units total) by mouth once a week., Disp: 15 capsule, Rfl: 2    hydrocortisone (PROCTOSOL HC) 2.5 % External Cream, Place 1 Application rectally 2 (two) times daily., Disp: 1 each, Rfl: 3    No Known Allergies     Review of Systems: Oncology specific ROS negative except as per HPI    /73 (BP Location: Left arm, Patient Position: Sitting, Cuff Size: large)   Pulse 108   Temp 98.2 °F (36.8 °C) (Oral)   Resp 18   Ht 1.905 m (6' 3\")   Wt 99.3 kg (219 lb)   SpO2 99%   BMI 27.37 kg/m²   Wt Readings from Last 6  Encounters:   04/16/24 99.3 kg (219 lb)   03/28/24 102.1 kg (225 lb)   05/30/23 105.7 kg (233 lb)   05/15/23 105.7 kg (233 lb)   03/09/23 106.1 kg (234 lb)   03/02/23 107.5 kg (237 lb)   General: Patient is alert and oriented x 3, not in acute distress.  HEENT: EOMs intact.  Non icterus, no palor  Neck: ROM intact. No adenopathy  Chest: Clear to lungs bilaterally  Abdomen: Soft, non tender. ND; no appreciable hepatosplenomegaly  Extremities: No edema, cyanosis, or bruising  Neurological: motor strength grossly intact, MA4E  Psych: appropriate mood and affect      Laboratory assessed and reviewed today: CBC, CMP, LDH    Impression and Plan:    1.) CLL, stage 0, FISH negative, low risk    --now with systemic signs of illness reporting early satiety, decreased appetite, unintentional weight loss and fatigue. Ordered for CT scan to eval for SLL component of CLL, can review results via MyChart  -pt's labs look fine though; Return q 3 mo based on his new symptoms    2.) History of gout- ok to do allopurinol for gout txt per pcp if indicated, does not need it for CLL    MDM: Kendal Palacio MD  [facility]  [facility] [address], [place] [address]

## 2024-04-16 NOTE — PATIENT INSTRUCTIONS
CHRONIC LYMPHOCYTIC LEUKEMIA  LEUKEMIA  Acute Lymphoblastic Leukemia  Acute Myeloid Leukemia  Chronic Lymphocytic Leukemia  Signs and Symptoms  Diagnosis  Treatment  Related Diseases  Chronic Myeloid Leukemia  Hairy Cell Leukemia  Chronic Myelomonocytic Leukemia  Juvenile Myelomonocytic Leukemia  Large Granular Lymphocytic Leukemia  Blastic Plasmacytoid Dendritic Cell Neoplasm  B-Cell Prolymphocytic Leukemia (B-PLL)  T-Cell Prolymphocytic Leukemia (T-PLL)  Get Personalized Information & Support   [phone]   Live Chat   Email  Chronic lymphocytic leukemia (CLL)  Is a type of blood cancer that begins in the bone marrow  Can progress either slowly or quickly depending on the form it takes  Click here to access CLL statistics.    What You Should Know  CLL is the most common type of leukemia in adults in Western countries.   Hematologists and oncologists are specialists who treat people who have CLL or other types of blood cancer.  Some people have CLL that grows slowly while other people have CLL that grows faster.  CLL patients have a number of effective treatment options available to them.  For slow-growing CLL, watch and wait or watchful waiting may be an appropriate treatment approach.  Many people with CLL live good-quality lives for years with medical care.  What You Should Do  Talk with your doctor about your diagnostic tests and what the results mean.  Talk with your doctor about all your treatment options and the results you can expect from treatment.  Ask your doctor whether a clinical trial is a good treatment option for you.  To download lists of suggested questions to ask your healthcare providers,  click here  .  How Does CLL Develop?  The DNA (genetic material) of a developing stem cell in the bone marrow is damaged. This is called an “acquired mutation.”    Stem cells form blood cells (red cells, white cells and platelets).  This damaged cell becomes a leukemic cell and multiplies into many CLL  cells. The CLL cells grow and survive better than normal cells.    CLL doesn't completely interfere with the development of mature red cells, white cells and platelets. Therefore, CLL is generally less severe than acute leukemia.  As a result, the number of healthy blood cells (red cells, white cells and platelets) is usually lower than normal.    Anemia is a condition when there is a low number of red cells in the blood which can cause fatigue and shortness of breath.  Neutropenia is a condition when there is a low number of white cells so that the immune system can't effectively guard against infection due to a lack of neutrophils (a type of white cell).  Thrombocytopenia is a condition when there is a low number of platelets which can cause bleeding and easy bruising with no apparent cause.  Low numbers of all three blood cell counts is called pancytopenia.    Two Different Forms of CLL  CLL can be slow growing and need no immediate treatment. Another form can grow at a faster rate, requiring treatment right away:    The slower-growing form has an increased number of lymphocytes (another type of white cell) but a normal or slightly below normal level of red cells, platelets and neutrophils in the blood. This form can remain stable for years.  The faster-growing form has too many CLL cells in the blood that block normal cell production. As a result, the number of fully functioning red cells and platelet levels drop lower than normal.  People with the faster-growing variety may have:    Enlarged lymph nodes. The nodes can compress nearby organs, causing them to function improperly. For example, an enlarged node pressing on the stomach can interfere with gastrointestinal or urinary tract functions.  A severe immunoglobulin deficiency. Immunoglobulins are proteins in the blood that fight infection. Low levels of immunoglobulins, sometimes combined with low neutrophil levels, can lead to recurrent infections.  An  enlarged spleen. The spleen can press on the stomach causing early fullness (satiety) while eating food and also discomfort in the left upper part of the abdomen.  If not treated, the faster-growing form of CLL can eventually lead to anemia, neutropenia or thrombocytopenia.    Risk Factors  Doctors don't know why some cells become leukemic cells and others don't. For most people who have chronic lymphocytic leukemia (CLL), there are no obvious reasons why they developed the disease.    CLL has generally not been associated with any environmental or external risk factors with an exception of Agent Organge (see \"A Risk for [place] Veterans,\" below).  Some studies also suggest that exposure to benzene in the workplace increases the risk of CLL; however, the evidence is not as strong for CLL as with other blood cancers.   There are no known ways to prevent CLL.  You can't catch CLL from someone else.  Experts have found that in a small number of cases, first-degree relatives (parents and siblings) of people with CLL are approximately four times more likely to develop CLL than people who don't have first-degree relatives with the disease. However, the risk is still small.   A Risk for [place] Veterans  Researchers have found a potential association between CLL and exposure to herbicides like Agent Orange used during the [place] conflict from 1961 to 1971. The [place] Department of Veterans Affairs (VA) entitles those exposed to receive disability compensation. If you have CLL and think you may have been exposed to Agent Orange or other herbicides, you're entitled to benefits. The VA is urging [place] veterans to apply for compensation to begin processing their claims. For more information, visit the U.S. Department of Veterans Affairs website.    Source: Chronic Lymphocytic Leukemia. Reviewed by Prisca Santamaria MD.     Helpful Resources:  Webcasts  Videos  Information Booklets  Financial Support  Online Chats  [facility]  Community  [id number] Support  Support Groups  eNewsletters

## 2024-04-17 DIAGNOSIS — C91.10 CLL (CHRONIC LYMPHOCYTIC LEUKEMIA) (HCC): Primary | ICD-10-CM

## 2024-04-18 RX ORDER — LISINOPRIL 30 MG/1
30 TABLET ORAL DAILY
Qty: 90 TABLET | Refills: 3 | Status: SHIPPED | OUTPATIENT
Start: 2024-04-18

## 2024-04-25 ENCOUNTER — OFFICE VISIT (OUTPATIENT)
Facility: CLINIC | Age: 69
End: 2024-04-25
Payer: COMMERCIAL

## 2024-04-25 VITALS
BODY MASS INDEX: 27.48 KG/M2 | SYSTOLIC BLOOD PRESSURE: 134 MMHG | HEART RATE: 80 BPM | OXYGEN SATURATION: 98 % | WEIGHT: 221 LBS | HEIGHT: 75 IN | DIASTOLIC BLOOD PRESSURE: 84 MMHG

## 2024-04-25 DIAGNOSIS — N50.89 SCROTAL SWELLING: ICD-10-CM

## 2024-04-25 DIAGNOSIS — I10 ESSENTIAL HYPERTENSION: Primary | ICD-10-CM

## 2024-04-25 DIAGNOSIS — E11.9 TYPE 2 DIABETES MELLITUS WITHOUT COMPLICATION, WITHOUT LONG-TERM CURRENT USE OF INSULIN (HCC): ICD-10-CM

## 2024-04-25 PROCEDURE — 3008F BODY MASS INDEX DOCD: CPT | Performed by: FAMILY MEDICINE

## 2024-04-25 PROCEDURE — 3079F DIAST BP 80-89 MM HG: CPT | Performed by: FAMILY MEDICINE

## 2024-04-25 PROCEDURE — 99214 OFFICE O/P EST MOD 30 MIN: CPT | Performed by: FAMILY MEDICINE

## 2024-04-25 PROCEDURE — 3075F SYST BP GE 130 - 139MM HG: CPT | Performed by: FAMILY MEDICINE

## 2024-04-25 PROCEDURE — 1160F RVW MEDS BY RX/DR IN RCRD: CPT | Performed by: FAMILY MEDICINE

## 2024-04-25 PROCEDURE — 1159F MED LIST DOCD IN RCRD: CPT | Performed by: FAMILY MEDICINE

## 2024-04-25 NOTE — PROGRESS NOTES
HPI:    Patient ID: Johann Milner is a 69 year old male who presents for f/u.    HPI  Taking amlodipine 5mg daily since last visit.   Still taking lisinopril 30mg daily.   BP has improved.   Home BP around 119-126/70s consistently now.     Home BP cuff: 157/88  Repeat manual cuff: 134/84    Feels exhausted all the time. Recently saw Dr. Palacio who ordered CT chest/abdomen/pelvis which he has scheduled.   Scrotum feels heavy and moreso swelling on left side. This is new as of the past month or so.   Feels a 'warmth' when he urinates.   No other associated symptoms.   These symptoms seemed to improve with the abx he was given for STD concerns, but slowly returned after he completed.     Past Medical History:    Diabetes ([facility])    Dyslipidemia    Essential hypertension    Former smoker    15 pack year history and quit in 2000.    High blood pressure    High cholesterol    Hyperlipidemia    Type 2 diabetes mellitus without complication, without long-term current use of insulin ([facility])        Current Outpatient Medications   Medication Sig Dispense Refill    lisinopril 30 MG Oral Tab Take 1 tablet (30 mg total) by mouth daily. 90 tablet 3    simvastatin 40 MG Oral Tab Take 1 tablet (40 mg total) by mouth nightly. 90 tablet 3    amLODIPine 5 MG Oral Tab Take 1 tablet (5 mg total) by mouth daily. 90 tablet 3    metFORMIN HCl 1000 MG Oral Tab Take 1 tablet (1,000 mg total) by mouth daily with breakfast. 90 tablet 0    ergocalciferol 1.25 MG (60559 UT) Oral Cap Take 1 capsule (50,000 Units total) by mouth once a week. 15 capsule 2    hydrocortisone (PROCTOSOL HC) 2.5 % External Cream Place 1 Application rectally 2 (two) times daily. 1 each 3        No Known Allergies    Review of Systems   Constitutional: Negative.    Respiratory: Negative.     Cardiovascular: Negative.    Gastrointestinal: Negative.    Genitourinary:         See HPI. Otherwise negative.    Neurological: Negative.    All other systems reviewed and are  negative.           /84   Pulse 80   Ht 6' 3\" (1.905 m)   Wt 221 lb (100.2 kg)   SpO2 98%   BMI 27.62 kg/m²     PHYSICAL EXAM:   Physical Exam  Constitutional:       General: He is not in acute distress.     Appearance: Normal appearance. He is not ill-appearing, toxic-appearing or diaphoretic.   HENT:      Head: Normocephalic and atraumatic.   Eyes:      Extraocular Movements: Extraocular movements intact.      Conjunctiva/sclera: Conjunctivae normal.   Cardiovascular:      Rate and Rhythm: Normal rate and regular rhythm.      Pulses: Normal pulses.      Heart sounds: Normal heart sounds. No murmur heard.     No friction rub. No gallop.   Pulmonary:      Effort: Pulmonary effort is normal. No respiratory distress.      Breath sounds: Normal breath sounds. No wheezing, rhonchi or rales.   Genitourinary:     Penis: Normal.       Testes: Normal.      Comments: No apparent scrotal swelling.  exam otherwise normal.  Musculoskeletal:      Cervical back: Neck supple.      Right lower leg: No edema.      Left lower leg: No edema.   Skin:     General: Skin is warm and dry.      Capillary Refill: Capillary refill takes less than 2 seconds.   Neurological:      General: No focal deficit present.      Mental Status: He is alert.   Psychiatric:         Mood and Affect: Mood normal.         Behavior: Behavior normal.         Thought Content: Thought content normal.         Judgment: Judgment normal.             ASSESSMENT/PLAN:     Encounter Diagnoses   Name Primary?    Essential hypertension Yes    Scrotal swelling     Type 2 diabetes mellitus without complication, without long-term current use of insulin ([facility])        1. Essential hypertension  -Now well-controlled at home since adding amlodipine.  -CPM with amlodipine 5mg daily & lisinopril 30mg daily.     2. Scrotal swelling  - US SCROTUM W/ DOPPLER (CPT=93975/08202); Future  -Recommend ultrasound. Consider urology referral pending results.  -Of note, he has CT  chest/abdomen/pelvis scheduled per Dr. Palacio.     3. Type 2 diabetes mellitus without complication, without long-term current use of insulin (HCC)  - OPHTHALMOLOGY - INTERNAL  -Overdue for ophtho exam. Referral generated.   -CPM.    Meds This Visit:  Requested Prescriptions      No prescriptions requested or ordered in this encounter       Imaging & Referrals:  OPHTHALMOLOGY - INTERNAL  US SCROTUM W/ DOPPLER (VUW=[id number])       Khris Fountain DO  ID#[id number]

## 2024-05-02 NOTE — TELEPHONE ENCOUNTER
Refill passed per [facility] protocol.      Requested Prescriptions   Pending Prescriptions Disp Refills    METFORMIN HCL 1000 MG Oral Tab [Pharmacy Med Name: metFORMIN HCl 1000 MG Oral Tablet] 90 tablet 3     Sig: TAKE 1 TABLET BY MOUTH DAILY  WITH BREAKFAST       Diabetes Medication Protocol Passed - 4/30/2024  9:11 PM        Passed - Last A1C < 7.5 and within past 6 months     Lab Results   Component Value Date    A1C 5.6 03/28/2024             Passed - In person appointment or virtual visit in the past 6 mos or appointment in next 3 mos     Recent Outpatient Visits              1 week ago Essential hypertension    [facility] DustyLeedeyKhris,     Office Visit    2 weeks ago CLL (chronic lymphocytic leukemia) (HCC)    [facility] Hematology Oncology Jhon Palacio MD    Office Visit    1 month ago Encounter for annual health examination    [facility] Khris Fountain DO    Office Visit    11 months ago Wrist pain, acute, left    [facility] [place]    Office Visit    11 months ago Wrist pain, acute, left    [facility], [place]    Office Visit          Future Appointments         Provider Department Appt Notes    In 3 days [facility] US [address] Ultrasound - [place] Finding what happening with my body    In 1 week [facility] CT [address] Know what's happening after testing    In 2 months Jhon Palacio MD [facility] Hematology Oncology 3 m f/u.md                    Passed - Microalbumin procedure in past 12 months or taking ACE/ARB        Passed - EGFRCR or GFRAA > 50     GFR Evaluation  EGFRCR: 86 , resulted on 3/28/2024          Passed - GFR in the past 12 months              Future Appointments         Provider Department Appt Notes    In 3 days [facility] US [address] Ultrasound -  [place] [facility] what happening with my body    In 1 week [facility] CT RM1 CARD [facility] CT Know what's happening after testing    In 2 months Jhon Palacio MD [facility] Hematology Oncology 3 m f/u.md            Recent Outpatient Visits              1 week ago Essential hypertension    [facility] DustyBreaKhris, DO    Office Visit    2 weeks ago CLL (chronic lymphocytic leukemia) (HCC)    [facility] Hematology Oncology Jhon Palacio MD    Office Visit    1 month ago Encounter for annual health examination    [facility] Khris Fountain DO    Office Visit    11 months ago Wrist pain, acute, left    [facility],     Office Visit    11 months ago Wrist pain, acute, left    [facility], Tay Salazar, DO    Office Visit

## 2024-05-05 ENCOUNTER — HOSPITAL ENCOUNTER (OUTPATIENT)
Dept: ULTRASOUND IMAGING | Age: 69
Discharge: HOME | End: 2024-05-05
Attending: FAMILY MEDICINE
Payer: MEDICARE

## 2024-05-05 ENCOUNTER — HOSPITAL ENCOUNTER (OUTPATIENT)
Dept: ULTRASOUND IMAGING | Age: 69
End: 2024-05-05
Attending: FAMILY MEDICINE
Payer: MEDICARE

## 2024-05-05 DIAGNOSIS — N50.89 SCROTAL SWELLING: ICD-10-CM

## 2024-05-05 PROCEDURE — 93975 VASCULAR STUDY: CPT | Performed by: FAMILY MEDICINE

## 2024-05-05 PROCEDURE — 76870 US EXAM SCROTUM: CPT | Performed by: FAMILY MEDICINE

## 2024-05-06 DIAGNOSIS — R93.812 ABNORMAL FINDING ON DIAGNOSTIC IMAGING OF LEFT TESTICLE: Primary | ICD-10-CM

## 2024-05-09 ENCOUNTER — HOSPITAL ENCOUNTER (OUTPATIENT)
Dept: CT IMAGING | Facility: HOSPITAL | Age: 69
Discharge: HOME | End: 2024-05-09
Attending: INTERNAL MEDICINE
Payer: MEDICARE

## 2024-05-09 DIAGNOSIS — R53.83 OTHER FATIGUE: ICD-10-CM

## 2024-05-09 DIAGNOSIS — C91.10 CLL (CHRONIC LYMPHOCYTIC LEUKEMIA) (HCC): ICD-10-CM

## 2024-05-09 DIAGNOSIS — R63.4 UNINTENTIONAL WEIGHT LOSS: ICD-10-CM

## 2024-05-09 DIAGNOSIS — R68.81 EARLY SATIETY: ICD-10-CM

## 2024-05-09 LAB
CREAT BLD-MCNC: 0.9 MG/DL
EGFRCR SERPLBLD CKD-EPI 2021: 92 ML/MIN/1.73M2 (ref 60–?)

## 2024-05-09 PROCEDURE — 74177 CT ABD & PELVIS W/CONTRAST: CPT | Performed by: INTERNAL MEDICINE

## 2024-05-09 PROCEDURE — 71260 CT THORAX DX C+: CPT | Performed by: INTERNAL MEDICINE

## 2024-05-09 PROCEDURE — 82565 ASSAY OF CREATININE: CPT

## 2024-05-14 ENCOUNTER — TELEPHONE (OUTPATIENT)
Dept: HEMATOLOGY/ONCOLOGY | Facility: HOSPITAL | Age: 69
End: 2024-05-14

## 2024-05-14 NOTE — TELEPHONE ENCOUNTER
Called and unable to reach patient. Left VM requesting call back to schedule a sooner appointment with Dr. Palacio. Provided office phone number.

## 2024-05-15 ENCOUNTER — TELEPHONE (OUTPATIENT)
Dept: HEMATOLOGY/ONCOLOGY | Facility: HOSPITAL | Age: 69
End: 2024-05-15

## 2024-05-15 NOTE — TELEPHONE ENCOUNTER
Called and spoke with patient. Told him Dr. Palacio wants to see him for sooner appointment to review his CT scan results with him. Offered available appointment dates/times for today and next week. Follow up appointment scheduled for Monday, 5/20 at 12:30 pm. Patient stated understanding and thanked me for the call.

## 2024-05-16 ENCOUNTER — TELEPHONE (OUTPATIENT)
Dept: GASTROENTEROLOGY | Facility: CLINIC | Age: 69
End: 2024-05-16

## 2024-05-16 ENCOUNTER — TELEPHONE (OUTPATIENT)
Dept: HEMATOLOGY/ONCOLOGY | Facility: HOSPITAL | Age: 69
End: 2024-05-16

## 2024-05-16 DIAGNOSIS — R93.5 ABNORMAL CT OF THE ABDOMEN: Primary | ICD-10-CM

## 2024-05-16 NOTE — TELEPHONE ENCOUNTER
I called the patient to inform him of recommendations following tumor board on Tuesday.  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 Moy for confirmation.  If this is lymphoma, the patient will proceed with treatment with radiation oncology since he is symptomatic with fatigue, decreased appetite and weight loss reported last month in clinic.  I will notify Dr. Moy's office so that he can call the patient to schedule the procedure. I will follow-up with Johann following biopsy to review results again. He thanked me for the call and information. He is aware that he does not need to come into clinic on Monday for a visit. Pt thanked me for the call and information.    Jhon Palacio MD  [facility]  [facility]  [address] [place] [id number]

## 2024-05-16 NOTE — TELEPHONE ENCOUNTER
Scheduled for:  EGD 24768 EUS 13057 w/FNA  Provider Name:  Dr. Moy   Date:  5/20/2024  Location:  [facility]  Sedation:  MAC  Time:  2:15pm, (pt is aware to arrive at 1:15pm)   Prep:  NPO  Meds/Allergies Reconciled?:  Physician reviewed     Diagnosis with codes:  abnormal CT abdomen R93.3  Was patient informed to call insurance with codes (Y/N):  Yes, I confirmed [employer] insurance with this patient.      Referral sent?:  Referral was sent at the time of electronic surgical scheduling.   [facility]?:  Referral was sent at the time of electronic surgical scheduling.      Medication Orders:  n/a  Misc Orders:  n/a     Further instructions given by staff:   I discussed the prep instructions with the patient which he verbally understood and is aware that I will mail the instructions today.

## 2024-05-16 NOTE — TELEPHONE ENCOUNTER
Contacted by Dr. Palacio regarding referral for EUS.  Reviewed imaging with radiology yesterday with lesion appearing amenable to EUS with FNA/B.  Dr. Palacio did communicate with the patient regarding this request. Communicated with Dr. Palacio today will plan for scheduling.    GI staff:    Please schedule EGD and EUS with FNA for this Monday 5/20 at the [facility] with [facility] for abnormal CT abdomen    Hold metformin day of    Thanks    MD Reji Melchor-[facility] - Gastroenterology  5/16/2024  8:45 AM

## 2024-05-20 ENCOUNTER — ANESTHESIA EVENT (OUTPATIENT)
Dept: ENDOSCOPY | Facility: HOSPITAL | Age: 69
End: 2024-05-20

## 2024-05-20 ENCOUNTER — ANESTHESIA (OUTPATIENT)
Dept: ENDOSCOPY | Facility: HOSPITAL | Age: 69
End: 2024-05-20

## 2024-05-20 ENCOUNTER — HOSPITAL ENCOUNTER (OUTPATIENT)
Facility: HOSPITAL | Age: 69
Setting detail: HOSPITAL OUTPATIENT SURGERY
Discharge: HOME | End: 2024-05-20
Attending: INTERNAL MEDICINE | Admitting: INTERNAL MEDICINE

## 2024-05-20 VITALS
OXYGEN SATURATION: 99 % | HEART RATE: 79 BPM | HEIGHT: 75 IN | DIASTOLIC BLOOD PRESSURE: 92 MMHG | BODY MASS INDEX: 27.98 KG/M2 | RESPIRATION RATE: 21 BRPM | SYSTOLIC BLOOD PRESSURE: 112 MMHG | WEIGHT: 225 LBS

## 2024-05-20 DIAGNOSIS — R93.5 ABNORMAL ABDOMINAL CT SCAN: ICD-10-CM

## 2024-05-20 DIAGNOSIS — R63.4 UNINTENTIONAL WEIGHT LOSS: ICD-10-CM

## 2024-05-20 DIAGNOSIS — C91.10 CLL (CHRONIC LYMPHOCYTIC LEUKEMIA) (HCC): Primary | ICD-10-CM

## 2024-05-20 LAB — GLUCOSE BLDC GLUCOMTR-MCNC: 112 MG/DL (ref 70–99)

## 2024-05-20 PROCEDURE — BD47ZZZ ULTRASONOGRAPHY OF GASTROINTESTINAL TRACT: ICD-10-PCS | Performed by: INTERNAL MEDICINE

## 2024-05-20 PROCEDURE — 43237 ENDOSCOPIC US EXAM ESOPH: CPT | Performed by: INTERNAL MEDICINE

## 2024-05-20 RX ORDER — SODIUM CHLORIDE, SODIUM LACTATE, POTASSIUM CHLORIDE, CALCIUM CHLORIDE 600; 310; 30; 20 MG/100ML; MG/100ML; MG/100ML; MG/100ML
INJECTION, SOLUTION INTRAVENOUS CONTINUOUS
Status: DISCONTINUED | OUTPATIENT
Start: 2024-05-20 | End: 2024-05-20

## 2024-05-20 RX ORDER — NALOXONE HYDROCHLORIDE 0.4 MG/ML
0.08 INJECTION, SOLUTION INTRAMUSCULAR; INTRAVENOUS; SUBCUTANEOUS ONCE AS NEEDED
Status: DISCONTINUED | OUTPATIENT
Start: 2024-05-20 | End: 2024-05-20

## 2024-05-20 RX ORDER — GLYCOPYRROLATE 0.2 MG/ML
INJECTION, SOLUTION INTRAMUSCULAR; INTRAVENOUS AS NEEDED
Status: DISCONTINUED | OUTPATIENT
Start: 2024-05-20 | End: 2024-05-22 | Stop reason: SURG

## 2024-05-20 RX ORDER — LIDOCAINE HYDROCHLORIDE 10 MG/ML
INJECTION, SOLUTION EPIDURAL; INFILTRATION; INTRACAUDAL; PERINEURAL AS NEEDED
Status: DISCONTINUED | OUTPATIENT
Start: 2024-05-20 | End: 2024-05-22 | Stop reason: SURG

## 2024-05-20 RX ADMIN — GLYCOPYRROLATE 0.2 MG: 0.2 INJECTION, SOLUTION INTRAMUSCULAR; INTRAVENOUS at 13:55:00

## 2024-05-20 RX ADMIN — LIDOCAINE HYDROCHLORIDE 50 MG: 10 INJECTION, SOLUTION EPIDURAL; INFILTRATION; INTRACAUDAL; PERINEURAL at 13:58:00

## 2024-05-20 RX ADMIN — SODIUM CHLORIDE, SODIUM LACTATE, POTASSIUM CHLORIDE, CALCIUM CHLORIDE: 600; 310; 30; 20 INJECTION, SOLUTION INTRAVENOUS at 13:40:00

## 2024-05-20 NOTE — H&P
History & Physical Examination    Patient Name: Johann Milner  MRN: [id number]  CSN: [id number]  YOB: 1955    Diagnosis: abnormal CT abdomen, lymphadenopathy    Medications Prior to Admission   Medication Sig Dispense Refill Last Dose    metFORMIN HCl 1000 MG Oral Tab Take 1 tablet (1,000 mg total) by mouth daily with breakfast. 90 tablet 3 2024    lisinopril 30 MG Oral Tab Take 1 tablet (30 mg total) by mouth daily. 90 tablet 3 2024 at 0600    simvastatin 40 MG Oral Tab Take 1 tablet (40 mg total) by mouth nightly. 90 tablet 3 2024 at 2000    amLODIPine 5 MG Oral Tab Take 1 tablet (5 mg total) by mouth daily. 90 tablet 3 2024 at 0600    ergocalciferol 1.25 MG (19968 UT) Oral Cap Take 1 capsule (50,000 Units total) by mouth once a week. 15 capsule 2 2024    hydrocortisone (PROCTOSOL HC) 2.5 % External Cream Place 1 Application rectally 2 (two) times daily. 1 each 3     [] doxycycline 100 MG Oral Cap Take 1 capsule (100 mg total) by mouth 2 (two) times daily for 7 days. 14 capsule 0      Current Facility-Administered Medications   Medication Dose Route Frequency    lactated ringers infusion   Intravenous Continuous       Allergies: No Known Allergies    Past Medical History:    Diabetes (HCC)    Dyslipidemia    Essential hypertension    Former smoker    15 pack year history and quit in .    High blood pressure    High cholesterol    Hyperlipidemia    Type 2 diabetes mellitus without complication, without long-term current use of insulin (HCC)     Past Surgical History:   Procedure Laterality Date    Colonoscopy N/A 2022    Procedure: COLONOSCOPY;  Surgeon: Shreyas Osorio MD;  Location: [facility] ENDO    Hernia surgery      Knee arthroscopy       Family History   Problem Relation Age of Onset    Cancer Father     Genetic Disease Mother     Heart Disease Mother     Cancer Maternal Grandmother      Social History     Tobacco Use    Smoking status: Former     Current  packs/day: 0.00     Average packs/day: 0.8 packs/day for 20.0 years (15.0 ttl pk-yrs)     Types: Cigarettes     Start date: 1980     Quit date: 2000     Years since quittin.3    Smokeless tobacco: Never    Tobacco comments:     Stop smoking    Substance Use Topics    Alcohol use: Yes     Alcohol/week: 13.0 standard drinks of alcohol     Types: 8 Cans of beer, 5 Shots of liquor per week       SYSTEM Check if Physical Exam is Normal If not normal, please explain:   HEENT [ X]    NECK  [ X]    HEART [ X]    LUNGS [ X]    ABDOMEN [ X]    EXTREMITIES [ X]      General:awake, cooperative, no acute distress  HEENT: EOMI, no scleral icterus, MMM; oral pharnyx is without exudates or lesions  Neck: no lymphadenopathy; thyroid is not enlarged and without nodules  CV: RRR  Resp: non-labored breathing  Abd: soft, non-tender, non-distended  Ext: no lower extremity swelling  Neuro: Alert, Oriented X 3  Skin: no rashes, bruises  Psych: normal affect  I have discussed the risks and benefits and alternatives of the procedure with the patient/family.  They understand and agreed to proceed with plan of care.   EGD Consent: I have discussed the risks, benefits, and alternatives to EGD with the patient [who demonstrated understanding], including but not limited to the risks of bleeding, infection, pain, perforation as well as the cardiopulmonary risks of anesthesia all leading to prolonged hospital stay or surgical intervention. All questions were answered to the patient’s satisfaction. The patient elected to proceed with EGD with intervention [i.e. Biopsy, dilatation, control of bleeding, etc.] as indicated.  EUS Consent: I have discussed the risks, benefits, and alternatives to EUS with the patient [who demonstrated understanding], including but not limited to the risks of bleeding, infection, pain, pancreatitis, perforation, missed lesions as well as the cardiopulmonary risks of anesthesia all leading to prolonged  hospital stay or surgical intervention. All questions were answered to the patient’s satisfaction. The patient elected to proceed with EUS with intervention [i.e. FNA, FNB, etc.] as indicated.  Khris Moy MD  [facility] Gastroenterology  5/20/2024  1:57 PM

## 2024-05-20 NOTE — OPERATIVE REPORT
Esophagogastroduodenoscopy (EGD) & Endoscopic Ultrasound (EUS) Report    Johann Milner     2/3/1955 Age 69 year old   PCP Khris Fountain DO Endoscopist Khris Moy MD     Date of procedure: 24    Procedure: EGD and EUS esophagus, stomach, duodenum    Pre-operative diagnosis: abnormal CT abdomen, lymphadenopathy     Post-operative diagnosis: see impression    Sedation: Monitored anesthesia care (MAC)    Consent: We discussed the risks/benefits and alternatives to this procedure, as well as the planned sedation.  Informed consent was obtained from the patient after the risks of the procedure were discussed, including but not limited to bleeding, perforation, aspiration, infection, or possibility of a missed lesion as well as the risks of anesthesia including but not limited to cardiopulmonary complications. The patient signed informed consent and elected to proceed with EGD/EUS with intervention [i.e. Biopsy, control of bleeding, dilatation, FNA, FNB, polypectomy, endoscopic mucosal resection, etc.] as indicated.     EGD & EUS procedure: The lubricated tip of the [id number] diagnostic video upper endoscope was carefully inserted and advanced using direct visualization into the posterior pharynx and ultimately into the esophagus. After the EGD was performed, the gastroscope was removed and the echoendoscope was then carefully inserted through the oropharynx, esophagus intubated, then advanced to the stomach and descending duodenum.    Air was then withdrawn and the echoendoscope was removed. The patient tolerated the procedure well. There were no immediate postoperative complications. The patient’s vital signs were monitored throughout the procedure and remained stable.    Estimated blood loss: none    Specimens collected:  none    Complications: none    EGD findings:    1. Esophagus: The squamocolumnar junction was noted and appeared unremarkable. The esophageal mucosa appeared  unremarkable.  2. Stomach: The stomach distended normally. Normal rugal folds were seen. The pylorus was patent. There were two small avms with one in the distal and one in the proximal stomach which were not bleeding. The gastric mucosa appeared unremarkable otherwise. Retroflexion revealed a normal fundus and cardia.   3. Duodenum: The duodenal mucosa appeared normal in the 1st and 2nd portion of the duodenum.     EUS findings:  Both linear and radial echoendoscope were both used to evaluate the areas adjacent to the upper gastrointestinal tract which took place for approximately 40 minutes.  Multiple normal structures adjacent to the esophagus, stomach, duodenum were visualized including unremarkable appearance of the left kidney, liver, spleen, pancreas.  There was no specifically visualized lymph nodes adjacent to the gastrointestinal tract that were visualized including the celiac axis. There were no abnormal appearing areas that appeared different than or separate from the normal appearing adjacent structures noted above.    Impression:  No visualized lymph node/lymphadenopathy adjacent to the upper gastrointestinal tract    Recommend:  Will discuss further with patient's oncologist regarding evaluation and management    MD [facility] - Gastroenterology  5/20/2024

## 2024-05-20 NOTE — DISCHARGE INSTRUCTIONS
Home Care Instructions for Gastroscopy with Sedation    Diet:  - Resume your regular diet as tolerated unless otherwise instructed.  - Start with light meals to minimize bloating.  - Do not drink alcohol today.    Medication:  - If you have questions about resuming your normal medications, please contact your Primary Care Physician.    Activities:  - Take it easy today. Do not return to work today.  - Do not drive today.  - Do not operate any machinery today (including kitchen equipment).    Gastroscopy:  - You may have a sore throat for 2-3 days following the exam. This is normal. Gargling with warm salt water (1/2 tsp salt to 1 glass warm water) or using throat lozenges will help.  - If you experience any sharp pain in your neck, abdomen or chest, vomiting of blood, oral temperature over 100 degrees Fahrenheit, light-headedness or dizziness, or any other problems, contact your doctor.    **If unable to reach your doctor, please go to the [facility]**    - Your referring physician will receive a full report of your examination.  - If you do not hear from your doctor's office within two weeks of your biopsy, please call them for your results.    You may be able to see your laboratory results in [employer] between 4 and 7 business days.  In some cases, your physician may not have viewed the results before they are released to [employer].  If you have questions regarding your results contact the physician who ordered the test/exam by phone or via [employer] by choosing \"Ask a Medical Question.\"

## 2024-05-20 NOTE — ANESTHESIA PREPROCEDURE EVALUATION
Anesthesia PreOp Note    HPI:     Johann Milner is a 69 year old male who presents for preoperative consultation requested by: Khris Moy MD    Date of Surgery: 5/20/2024    Procedure(s):  ESOPHAGOGASTRODUODENOSCOPY / ENDOSCOPIC ULTRASOUND with Fine Needle Aspiration  ENDOSCOPIC ULTRASOUND (EUS) with fine needle aspiration  Indication: Abnormal CT of the abdomen    Relevant Problems   No relevant active problems       NPO:                         History Review:  Patient Active Problem List    Diagnosis Date Noted   • CLL (chronic lymphocytic leukemia) (HCC) 04/18/2022   • Former smoker 04/06/2021   • Vitamin D deficiency 04/06/2021   • Erectile dysfunction 09/18/2020   • Dyslipidemia 03/06/2020   • Type 2 diabetes mellitus without complication, without long-term current use of insulin (HCC) 03/06/2020   • Essential hypertension 03/06/2020       Past Medical History:   • Diabetes (HCC)   • Dyslipidemia   • Essential hypertension   • Former smoker    15 pack year history and quit in 2000.   • High blood pressure   • High cholesterol   • Hyperlipidemia   • Type 2 diabetes mellitus without complication, without long-term current use of insulin (HCC)       Past Surgical History:   Procedure Laterality Date   • Colonoscopy N/A 9/21/2022    Procedure: COLONOSCOPY;  Surgeon: Shreyas Osorio MD;  Location: [facility]   • Hernia surgery     • Knee arthroscopy         Medications Prior to Admission   Medication Sig Dispense Refill Last Dose   • metFORMIN HCl 1000 MG Oral Tab Take 1 tablet (1,000 mg total) by mouth daily with breakfast. 90 tablet 3    • lisinopril 30 MG Oral Tab Take 1 tablet (30 mg total) by mouth daily. 90 tablet 3    • simvastatin 40 MG Oral Tab Take 1 tablet (40 mg total) by mouth nightly. 90 tablet 3    • amLODIPine 5 MG Oral Tab Take 1 tablet (5 mg total) by mouth daily. 90 tablet 3    • ergocalciferol 1.25 MG (25281 UT) Oral Cap Take 1 capsule (50,000 Units total) by mouth once a week. 15  capsule 2    • hydrocortisone (PROCTOSOL HC) 2.5 % External Cream Place 1 Application rectally 2 (two) times daily. 1 each 3    • [] doxycycline 100 MG Oral Cap Take 1 capsule (100 mg total) by mouth 2 (two) times daily for 7 days. 14 capsule 0      Current Facility-Administered Medications Ordered in Epic   Medication Dose Route Frequency Provider Last Rate Last Admin   • lactated ringers infusion   Intravenous Continuous Khris Moy MD         No current Psychiatric-ordered outpatient medications on file.       No Known Allergies    Family History   Problem Relation Age of Onset   • Cancer Father    • Genetic Disease Mother    • Heart Disease Mother    • Cancer Maternal Grandmother      Social History     Socioeconomic History   • Marital status:    Tobacco Use   • Smoking status: Former     Current packs/day: 0.00     Average packs/day: 0.8 packs/day for 20.0 years (15.0 ttl pk-yrs)     Types: Cigarettes     Start date: 1980     Quit date: 2000     Years since quittin.3   • Smokeless tobacco: Never   • Tobacco comments:     Stop smoking    Vaping Use   • Vaping status: Never Used   Substance and Sexual Activity   • Alcohol use: Yes     Alcohol/week: 13.0 standard drinks of alcohol     Types: 8 Cans of beer, 5 Shots of liquor per week   • Drug use: Never   Other Topics Concern   • Caffeine Concern No   • Exercise Yes   • Seat Belt Yes   • Special Diet No   • Stress Concern No   • Weight Concern No       Available pre-op labs reviewed.  Lab Results   Component Value Date    WBC 13.4 (H) 2024    RBC 4.39 2024    HGB 14.1 2024    HCT 40.6 2024    MCV 92.5 2024    MCH 32.1 2024    MCHC 34.7 2024    RDW 14.6 2024    .0 2024     Lab Results   Component Value Date     2024    K 3.9 2024     2024    CO2 29.0 2024    BUN 12 2024    CREATSERUM 0.96 2024    GLU 99 2024    CA 10.0  03/28/2024          Vital Signs:  Body mass index is 28.12 kg/m².   height is 1.905 m (6' 3\") and weight is 102.1 kg (225 lb).   Vitals:    05/16/24 1445   Weight: 102.1 kg (225 lb)   Height: 1.905 m (6' 3\")        Anesthesia Evaluation     Patient summary reviewed and Nursing notes reviewed    Airway   Mallampati: II  TM distance: >3 FB  Neck ROM: full  Dental      Pulmonary - normal exam   Cardiovascular   (+) hypertension    Rhythm: regular  Rate: normal    Neuro/Psych      GI/Hepatic/Renal      Endo/Other    (+) diabetes mellitus type 2 well controlled  Abdominal  - normal exam  (+) obese               Anesthesia Plan:   ASA:  3  Plan:   MAC  Informed Consent Plan and Risks Discussed With:  Patient    I have informed Johann Milner and/or legal guardian or family member of the nature of the anesthetic plan, benefits, risks including possible dental damage if relevant, major complications, and any alternative forms of anesthetic management.   All of the patient's questions were answered to the best of my ability. The patient desires the anesthetic management as planned.  Margi Epperson CRNA  5/20/2024 1:33 PM  Present on Admission:  **None**

## 2024-05-21 ENCOUNTER — TELEPHONE (OUTPATIENT)
Dept: GASTROENTEROLOGY | Facility: CLINIC | Age: 69
End: 2024-05-21

## 2024-05-21 NOTE — TELEPHONE ENCOUNTER
Spoke to patient and reviewed information below. Patient asked I send the number to schedule through [employer] since he is driving.

## 2024-05-21 NOTE — TELEPHONE ENCOUNTER
GI staff:    Please contact the patient and his wife. Let them know I did communicate with Dr. Palacio regarding next steps in his evaluation. He has ordered a PET/CT scan. Please ask Johann to schedule the PET CT. We can then determine next step after completing the PET/CT    Thanks    Khris Moy MD  [facility] - Gastroenterology  5/21/2024  9:03 AM

## 2024-05-22 NOTE — ANESTHESIA POSTPROCEDURE EVALUATION
Patient: Johann Milner    Procedure Summary       Date: 05/20/24 Room / Location: [facility] ENDOSCOPY 01 / [facility] ENDOSCOPY    Anesthesia Start: 1355 Anesthesia Stop:     Procedures:       ESOPHAGOGASTRODUODENOSCOPY (EGD)      ENDOSCOPIC ULTRASOUND (EUS) with fine needle aspiration Diagnosis:       Abnormal CT of the abdomen      (Gastric arteriovenous malformation (AVM))    Surgeons: Khris Moy MD Anesthesiologist: Margi Epperson CRNA    Anesthesia Type: MAC ASA Status: 3            Anesthesia Type: MAC    Vitals Value Taken Time   /92 05/20/24 1520   Temp  05/22/24 1217   Pulse 80 05/20/24 1520   Resp 21 05/20/24 1520   SpO2 99 % 05/20/24 1520   Vitals shown include unfiled device data.    [facility] AN Post Evaluation:   Patient Evaluated in PACU  Patient Participation: complete - patient participated  Level of Consciousness: awake and alert  Pain Score: 0  Pain Management: adequate  Airway Patency:patent  Yes    Nausea/Vomiting: none  Cardiovascular Status: acceptable and hemodynamically stable  Respiratory Status: acceptable and room air  Postoperative Hydration acceptable  Comments: Report to RN      Margi Epperson CRNA  5/22/2024 12:17 PM

## 2024-05-30 ENCOUNTER — HOSPITAL ENCOUNTER (OUTPATIENT)
Dept: NUCLEAR MEDICINE | Facility: HOSPITAL | Age: 69
Discharge: HOME | End: 2024-05-30
Attending: INTERNAL MEDICINE
Payer: MEDICARE

## 2024-05-30 DIAGNOSIS — R93.5 ABNORMAL ABDOMINAL CT SCAN: ICD-10-CM

## 2024-05-30 DIAGNOSIS — C91.10 CLL (CHRONIC LYMPHOCYTIC LEUKEMIA) (HCC): ICD-10-CM

## 2024-05-30 DIAGNOSIS — R63.4 UNINTENTIONAL WEIGHT LOSS: ICD-10-CM

## 2024-05-30 LAB — GLUCOSE BLDC GLUCOMTR-MCNC: 136 MG/DL (ref 70–99)

## 2024-05-30 PROCEDURE — 82962 GLUCOSE BLOOD TEST: CPT

## 2024-05-30 PROCEDURE — 78815 PET IMAGE W/CT SKULL-THIGH: CPT | Performed by: INTERNAL MEDICINE

## 2024-06-05 ENCOUNTER — TELEPHONE (OUTPATIENT)
Dept: HEMATOLOGY/ONCOLOGY | Facility: HOSPITAL | Age: 69
End: 2024-06-05

## 2024-06-05 NOTE — TELEPHONE ENCOUNTER
Called the patient back regarding PET CT scan results.  I left a message as this is a de-identified voicemail asking the pt to phone me back.    Jhon Palacio MD  [facility]  [address] [address]

## 2024-06-06 ENCOUNTER — TELEPHONE (OUTPATIENT)
Dept: HEMATOLOGY/ONCOLOGY | Facility: HOSPITAL | Age: 69
End: 2024-06-06

## 2024-06-07 ENCOUNTER — TELEPHONE (OUTPATIENT)
Dept: HEMATOLOGY/ONCOLOGY | Facility: HOSPITAL | Age: 69
End: 2024-06-07

## 2024-06-07 NOTE — TELEPHONE ENCOUNTER
Called the patient back regarding PET CT scan imaging showing gastrohepatic area is mildly hypermetabolic on PET/CT and its value is less than the baseline activity of the liver, so not too clinically concerning for malignancy. Pt mentions he is still having fatigue, decreased appetite and weight loss.  This first noted on CT scan where spleen was noted to be normal.  We will evaluate this image further at multidisciplinary tumor board on Tuesday.  Hoping to discussed this finding further with Dr. Moy to see if this could be sampled to rule out malignancy.  Requesting patient to follow-up with me next week after Wednesday following our tumor board discussion.    Jhon Palacio MD  [facility]  [address], [place] [address]

## 2024-06-11 ENCOUNTER — TELEPHONE (OUTPATIENT)
Dept: HEMATOLOGY/ONCOLOGY | Facility: HOSPITAL | Age: 69
End: 2024-06-11

## 2024-06-11 NOTE — TELEPHONE ENCOUNTER
Called and spoke with patient. Told him Dr. Palacio wants to see him for a follow up later this week. Follow up appointment scheduled for Friday, 6/14 at 11:00 am. Patient stated understanding and thanked me for the call.

## 2024-06-14 ENCOUNTER — OFFICE VISIT (OUTPATIENT)
Dept: HEMATOLOGY/ONCOLOGY | Facility: HOSPITAL | Age: 69
End: 2024-06-14
Attending: INTERNAL MEDICINE
Payer: MEDICARE

## 2024-06-14 ENCOUNTER — LAB ENCOUNTER (OUTPATIENT)
Dept: LAB | Facility: HOSPITAL | Age: 69
End: 2024-06-14
Attending: INTERNAL MEDICINE

## 2024-06-14 VITALS
OXYGEN SATURATION: 98 % | SYSTOLIC BLOOD PRESSURE: 139 MMHG | HEART RATE: 96 BPM | RESPIRATION RATE: 18 BRPM | TEMPERATURE: 99 F | WEIGHT: 217.5 LBS | BODY MASS INDEX: 27.04 KG/M2 | DIASTOLIC BLOOD PRESSURE: 69 MMHG | HEIGHT: 75 IN

## 2024-06-14 DIAGNOSIS — C91.10 CLL (CHRONIC LYMPHOCYTIC LEUKEMIA) (HCC): Primary | ICD-10-CM

## 2024-06-14 DIAGNOSIS — C91.10 CLL (CHRONIC LYMPHOCYTIC LEUKEMIA) (HCC): ICD-10-CM

## 2024-06-14 DIAGNOSIS — Z87.39 HISTORY OF GOUT: ICD-10-CM

## 2024-06-14 DIAGNOSIS — R59.0 ABDOMINAL LYMPHADENOPATHY: ICD-10-CM

## 2024-06-14 LAB
ALBUMIN SERPL-MCNC: 4.5 G/DL (ref 3.2–4.8)
ALBUMIN/GLOB SERPL: 1.5 {RATIO} (ref 1–2)
ALP LIVER SERPL-CCNC: 44 U/L
ALT SERPL-CCNC: 13 U/L
ANION GAP SERPL CALC-SCNC: 12 MMOL/L (ref 0–18)
AST SERPL-CCNC: 20 U/L (ref ?–34)
BASOPHILS # BLD AUTO: 0.04 X10(3) UL (ref 0–0.2)
BASOPHILS NFR BLD AUTO: 0.4 %
BILIRUB SERPL-MCNC: 0.5 MG/DL (ref 0.2–1.1)
BUN BLD-MCNC: 13 MG/DL (ref 9–23)
BUN/CREAT SERPL: 14.9 (ref 10–20)
CALCIUM BLD-MCNC: 9.4 MG/DL (ref 8.7–10.4)
CHLORIDE SERPL-SCNC: 105 MMOL/L (ref 98–112)
CO2 SERPL-SCNC: 24 MMOL/L (ref 21–32)
CREAT BLD-MCNC: 0.87 MG/DL
DEPRECATED RDW RBC AUTO: 50.8 FL (ref 35.1–46.3)
EGFRCR SERPLBLD CKD-EPI 2021: 93 ML/MIN/1.73M2 (ref 60–?)
EOSINOPHIL # BLD AUTO: 0.06 X10(3) UL (ref 0–0.7)
EOSINOPHIL NFR BLD AUTO: 0.5 %
ERYTHROCYTE [DISTWIDTH] IN BLOOD BY AUTOMATED COUNT: 14.9 % (ref 11–15)
FASTING STATUS PATIENT QL REPORTED: YES
GLOBULIN PLAS-MCNC: 3 G/DL (ref 2–3.5)
GLUCOSE BLD-MCNC: 73 MG/DL (ref 70–99)
HCT VFR BLD AUTO: 35.5 %
HGB BLD-MCNC: 12.3 G/DL
IMM GRANULOCYTES # BLD AUTO: 0.01 X10(3) UL (ref 0–1)
IMM GRANULOCYTES NFR BLD: 0.1 %
LDH SERPL L TO P-CCNC: 148 U/L
LYMPHOCYTES # BLD AUTO: 8.67 X10(3) UL (ref 1–4)
LYMPHOCYTES NFR BLD AUTO: 78.7 %
MCH RBC QN AUTO: 32.3 PG (ref 26–34)
MCHC RBC AUTO-ENTMCNC: 34.6 G/DL (ref 31–37)
MCV RBC AUTO: 93.2 FL
MONOCYTES # BLD AUTO: 0.49 X10(3) UL (ref 0.1–1)
MONOCYTES NFR BLD AUTO: 4.5 %
NEUTROPHILS # BLD AUTO: 1.74 X10 (3) UL (ref 1.5–7.7)
NEUTROPHILS # BLD AUTO: 1.74 X10(3) UL (ref 1.5–7.7)
NEUTROPHILS NFR BLD AUTO: 15.8 %
OSMOLALITY SERPL CALC.SUM OF ELEC: 291 MOSM/KG (ref 275–295)
PLATELET # BLD AUTO: 146 10(3)UL (ref 150–450)
POTASSIUM SERPL-SCNC: 4 MMOL/L (ref 3.5–5.1)
PROT SERPL-MCNC: 7.5 G/DL (ref 5.7–8.2)
RBC # BLD AUTO: 3.81 X10(6)UL
SODIUM SERPL-SCNC: 141 MMOL/L (ref 136–145)
WBC # BLD AUTO: 11 X10(3) UL (ref 4–11)

## 2024-06-14 PROCEDURE — 83615 LACTATE (LD) (LDH) ENZYME: CPT

## 2024-06-14 PROCEDURE — 85025 COMPLETE CBC W/AUTO DIFF WBC: CPT

## 2024-06-14 PROCEDURE — 80053 COMPREHEN METABOLIC PANEL: CPT

## 2024-06-14 PROCEDURE — 99214 OFFICE O/P EST MOD 30 MIN: CPT | Performed by: INTERNAL MEDICINE

## 2024-06-14 PROCEDURE — 36415 COLL VENOUS BLD VENIPUNCTURE: CPT

## 2024-06-14 NOTE — PROGRESS NOTES
Cancer Center Progress Note    Patient Name: Johann Milner   YOB: 1955   Medical Record Number: [id number]     Chief Complaint:  CLL     Oncology History:  69 year old seen prior history of CLL (2017) with normal WBC, lost to follow up, presenting with elevated WBC and ALC consistent with CLL, Normal FISH    6/14/2024  Here for follow up of CLL s/p PET scan imaging. 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 Pt continues to mention decreased appetite symptoms, but his weight is relatively stable from 2 mo ago.Otherwise, the pt has the absence of any recurrent fevers/infections, chills, NS, or lymphadenopathy. No other concerns on ROS.    PMH: CLL, HTN, DM  Family History: three sisters with breast cancer. father with bone cancer.   Social History: lives with wife, 3 kids. Quit> 20 years. retired from  at     Current Outpatient Medications:     metFORMIN HCl 1000 MG Oral Tab, Take 1 tablet (1,000 mg total) by mouth daily with breakfast., Disp: 90 tablet, Rfl: 3    lisinopril 30 MG Oral Tab, Take 1 tablet (30 mg total) by mouth daily., Disp: 90 tablet, Rfl: 3    simvastatin 40 MG Oral Tab, Take 1 tablet (40 mg total) by mouth nightly., Disp: 90 tablet, Rfl: 3    amLODIPine 5 MG Oral Tab, Take 1 tablet (5 mg total) by mouth daily., Disp: 90 tablet, Rfl: 3    ergocalciferol 1.25 MG (96955 UT) Oral Cap, Take 1 capsule (50,000 Units total) by mouth once a week., Disp: 15 capsule, Rfl: 2    hydrocortisone (PROCTOSOL HC) 2.5 % External Cream, Place 1 Application rectally 2 (two) times daily., Disp: 1 each, Rfl: 3    No Known Allergies     Review of Systems: Oncology specific ROS negative except as per HPI    /69 (BP Location: Left arm, Patient Position: Sitting, Cuff Size: large)   Pulse 96   Temp 99 °F (37.2 °C) (Oral)   Resp 18   Ht 1.905 m (6' 3\")   Wt 98.7 kg (217 lb 8 oz)   SpO2 98%   BMI  27.19 kg/m²   Wt Readings from Last 6 Encounters:   06/14/24 98.7 kg (217 lb 8 oz)   05/16/24 102.1 kg (225 lb)   04/25/24 100.2 kg (221 lb)   04/16/24 99.3 kg (219 lb)   03/28/24 102.1 kg (225 lb)   05/30/23 105.7 kg (233 lb)   General: Patient is alert and oriented x 3, not in acute distress.  HEENT: EOMs intact.  Non icterus, no palor  Neck: ROM intact. No adenopathy  Chest: Clear to lungs bilaterally  Abdomen: Soft, non tender. ND; no appreciable hepatosplenomegaly  Extremities: No edema, cyanosis, or bruising  Neurological: motor strength grossly intact, MA4E  Psych: appropriate mood and affect      Imaging:  PET/CT 5/30/24  Impression   CONCLUSION:     1. Enlarged gastrohepatic lymph node, stable from the prior CT with metabolic activity greater than the mediastinum and similar to the liver.  This confers a Deauville score of 3.     2. No other pathologic FDG uptake is identified.       3. Prostatomegaly.  Cardiomegaly. Additional chronic or incidental findings are described in the body of this report.     Deauville Score PET/CT Scan  1. No uptake above background.  2. Uptake at an initial site that is less than or equal to mediastinum.  3. Uptake at an initial site that is greater than mediastinum but less than or equal to liver.  4. Uptake at an initial site that is moderately increased compared to the liver at any site.  5a. Uptake at an initial site that is markedly increased compared to the liver.  5b. Uptake markedly increased compared to the liver at any new site that is possibly related to lymphoma.  X New areas of uptake unlikely related to lymphoma.       CT C/A/P 5/10/24  Impression   CONCLUSION:  1. A 31 x 72 mm paraceliac lymph node mass .  Findings are likely related to the known leukemia, or lymphoma.  2. Marked coronary artery calcification.       Impression and Plan:    1.) CLL, stage 0, FISH negative, low risk    -- Patient has decreased appetite but weight is relatively stable last 2 months.   Otherwise no systemic signs of illness.  Less concern for malignant potential from this gastrohepatic lymph node based on SUV of only 4.14 and liver being 4.16.    --CT scan reviewed and PET scan reviewed at tumor board  --rec pt for MRI abdomen for further evaluation of this gastrohepatic lymph node; will call w/ results  --surveillance labs now and f/u in 3 mo    2.) History of gout- ok to do allopurinol for gout txt per pcp if indicated, does not need it for CLL    MDM: Moderate    Jhon Palacio MD  [facility]  [facility] [facility]  [address] [place] [address], [place] [address]

## 2024-07-18 ENCOUNTER — HOSPITAL ENCOUNTER (OUTPATIENT)
Dept: MRI IMAGING | Facility: HOSPITAL | Age: 69
Discharge: HOME | End: 2024-07-18
Attending: INTERNAL MEDICINE
Payer: MEDICARE

## 2024-07-18 DIAGNOSIS — R59.0 ABDOMINAL LYMPHADENOPATHY: ICD-10-CM

## 2024-07-18 DIAGNOSIS — C91.10 CLL (CHRONIC LYMPHOCYTIC LEUKEMIA) (HCC): ICD-10-CM

## 2024-07-18 PROCEDURE — A9575 INJ GADOTERATE MEGLUMI 0.1ML: HCPCS | Performed by: INTERNAL MEDICINE

## 2024-07-18 PROCEDURE — 74183 MRI ABD W/O CNTR FLWD CNTR: CPT | Performed by: INTERNAL MEDICINE

## 2024-07-18 RX ORDER — GADOTERATE MEGLUMINE 376.9 MG/ML
20 INJECTION INTRAVENOUS
Status: COMPLETED | OUTPATIENT
Start: 2024-07-18 | End: 2024-07-18

## 2024-07-18 RX ADMIN — GADOTERATE MEGLUMINE 20 ML: 376.9 INJECTION INTRAVENOUS at 16:13:00

## 2024-07-25 ENCOUNTER — APPOINTMENT (OUTPATIENT)
Dept: HEMATOLOGY/ONCOLOGY | Facility: HOSPITAL | Age: 69
End: 2024-07-25
Attending: INTERNAL MEDICINE
Payer: MEDICARE

## 2024-08-16 RX ORDER — ERGOCALCIFEROL 1.25 MG/1
50000 CAPSULE, LIQUID FILLED ORAL WEEKLY
Qty: 15 CAPSULE | OUTPATIENT
Start: 2024-08-16

## 2024-08-16 NOTE — TELEPHONE ENCOUNTER
Please review. Protocol Failed; No Protocol  Ref Range & Units 3/28/24  8:36 AM   Vitamin D, 25OH, Total  30.0 - 100.0 ng/mL 88.6         Requested Prescriptions   Pending Prescriptions Disp Refills    ERGOCALCIFEROL 1.25 MG (80138 UT) Oral Cap [Pharmacy Med Name: Vitamin D (Ergocalciferol) 1.25 MG (26753 UT) Oral Capsule] 15 capsule 2     Sig: TAKE 1 CAPSULE BY MOUTH ONCE  WEEKLY       There is no refill protocol information for this order            Future Appointments         Provider Department Appt Notes    In 5 days Khris Fountain DO [facility] Correcting some pelvic issues    In 1 month Jhon Palacio MD [facility] Hematology Oncology 3 m [email or web address]          Recent Outpatient Visits              2 months ago CLL (chronic lymphocytic leukemia) ([facility])    [facility] Hematology Oncology Jhon Palacio MD    Office Visit    3 months ago Essential hypertension    [facility] Khris Fountain DO    Office Visit    4 months ago CLL (chronic lymphocytic leukemia) ([facility])    [facility] Hematology Oncology Jhon Palacio MD    Office Visit    4 months ago Encounter for annual health examination    [facility] Khris Fountain DO    Office Visit    1 year ago Wrist pain, acute, left    [facility] Tay Ames DO    Office Visit

## 2024-08-20 ENCOUNTER — TELEPHONE (OUTPATIENT)
Facility: CLINIC | Age: 69
End: 2024-08-20

## 2024-08-21 ENCOUNTER — OFFICE VISIT (OUTPATIENT)
Facility: CLINIC | Age: 69
End: 2024-08-21
Payer: COMMERCIAL

## 2024-08-21 VITALS
DIASTOLIC BLOOD PRESSURE: 86 MMHG | BODY MASS INDEX: 27.48 KG/M2 | HEIGHT: 75 IN | OXYGEN SATURATION: 98 % | HEART RATE: 98 BPM | WEIGHT: 221 LBS | SYSTOLIC BLOOD PRESSURE: 134 MMHG

## 2024-08-21 DIAGNOSIS — R93.89 ABNORMAL ULTRASOUND OF PELVIS: ICD-10-CM

## 2024-08-21 DIAGNOSIS — R10.2 PELVIC AND PERINEAL PAIN: ICD-10-CM

## 2024-08-21 DIAGNOSIS — R10.9 ABDOMINAL PRESSURE: Primary | ICD-10-CM

## 2024-08-21 LAB
APPEARANCE: CLEAR
BILIRUBIN: NEGATIVE
GLUCOSE (URINE DIPSTICK): NEGATIVE MG/DL
KETONES (URINE DIPSTICK): NEGATIVE MG/DL
LEUKOCYTES: NEGATIVE
MULTISTIX EXPIRATION DATE: NORMAL DATE
MULTISTIX LOT#: NORMAL NUMERIC
NITRITE, URINE: NEGATIVE
OCCULT BLOOD: NEGATIVE
PH, URINE: 5.5 (ref 4.5–8)
PROTEIN (URINE DIPSTICK): NEGATIVE MG/DL
SPECIFIC GRAVITY: 1.01 (ref 1–1.03)
URINE-COLOR: YELLOW
UROBILINOGEN,SEMI-QN: 0.2 MG/DL (ref 0–1.9)

## 2024-08-21 PROCEDURE — 87086 URINE CULTURE/COLONY COUNT: CPT | Performed by: FAMILY MEDICINE

## 2024-08-21 PROCEDURE — 1160F RVW MEDS BY RX/DR IN RCRD: CPT | Performed by: FAMILY MEDICINE

## 2024-08-21 PROCEDURE — 99214 OFFICE O/P EST MOD 30 MIN: CPT | Performed by: FAMILY MEDICINE

## 2024-08-21 PROCEDURE — 3075F SYST BP GE 130 - 139MM HG: CPT | Performed by: FAMILY MEDICINE

## 2024-08-21 PROCEDURE — 3008F BODY MASS INDEX DOCD: CPT | Performed by: FAMILY MEDICINE

## 2024-08-21 PROCEDURE — 1159F MED LIST DOCD IN RCRD: CPT | Performed by: FAMILY MEDICINE

## 2024-08-21 PROCEDURE — 81002 URINALYSIS NONAUTO W/O SCOPE: CPT | Performed by: FAMILY MEDICINE

## 2024-08-21 PROCEDURE — 3079F DIAST BP 80-89 MM HG: CPT | Performed by: FAMILY MEDICINE

## 2024-08-21 RX ORDER — SULFAMETHOXAZOLE/TRIMETHOPRIM 800-160 MG
1 TABLET ORAL 2 TIMES DAILY
Qty: 56 TABLET | Refills: 0 | Status: SHIPPED | OUTPATIENT
Start: 2024-08-21 | End: 2024-09-18

## 2024-08-21 NOTE — PROGRESS NOTES
HPI:    Patient ID: Johann Milner is a 69 year old male who presents for pelvic pressure.    HPI  Still feels pressure in left groin as well as perineal area.  No other associated symptoms.  No urinary symptoms.  This pressure did improve with his prior abx.     Past Medical History:    Diabetes (HCC)    Dyslipidemia    Essential hypertension    Former smoker    15 pack year history and quit in 2000.    High blood pressure    High cholesterol    Hyperlipidemia    Type 2 diabetes mellitus without complication, without long-term current use of insulin ([facility])        Current Outpatient Medications   Medication Sig Dispense Refill    sulfamethoxazole-trimethoprim -160 MG Oral Tab per tablet Take 1 tablet by mouth 2 (two) times daily for 28 days. 56 tablet 0    metFORMIN HCl 1000 MG Oral Tab Take 1 tablet (1,000 mg total) by mouth daily with breakfast. 90 tablet 3    lisinopril 30 MG Oral Tab Take 1 tablet (30 mg total) by mouth daily. 90 tablet 3    simvastatin 40 MG Oral Tab Take 1 tablet (40 mg total) by mouth nightly. 90 tablet 3    amLODIPine 5 MG Oral Tab Take 1 tablet (5 mg total) by mouth daily. 90 tablet 3    ergocalciferol 1.25 MG (23509 UT) Oral Cap Take 1 capsule (50,000 Units total) by mouth once a week. 15 capsule 2    hydrocortisone (PROCTOSOL HC) 2.5 % External Cream Place 1 Application rectally 2 (two) times daily. 1 each 3        No Known Allergies    Review of Systems   Constitutional: Negative.    Respiratory: Negative.     Cardiovascular: Negative.    Gastrointestinal:         See HPI. Otherwise neg.   Genitourinary:  Negative for decreased urine volume, difficulty urinating, dysuria, flank pain, frequency, hematuria, penile discharge, penile pain, penile swelling, scrotal swelling, testicular pain and urgency.        See HPI   Neurological: Negative.    All other systems reviewed and are negative.           /86   Pulse 98   Ht 6' 3\" (1.905 m)   Wt 221 lb (100.2 kg)   SpO2 98%   BMI  27.62 kg/m²     PHYSICAL EXAM:   Physical Exam  Constitutional:       General: He is not in acute distress.     Appearance: Normal appearance. He is not ill-appearing, toxic-appearing or diaphoretic.   HENT:      Head: Normocephalic and atraumatic.   Eyes:      Extraocular Movements: Extraocular movements intact.      Conjunctiva/sclera: Conjunctivae normal.   Cardiovascular:      Rate and Rhythm: Normal rate and regular rhythm.      Pulses: Normal pulses.      Heart sounds: Normal heart sounds. No murmur heard.     No friction rub. No gallop.   Pulmonary:      Effort: Pulmonary effort is normal. No respiratory distress.      Breath sounds: Normal breath sounds. No wheezing, rhonchi or rales.   Abdominal:      General: Abdomen is flat. Bowel sounds are normal. There is no distension.      Palpations: Abdomen is soft. There is no mass.      Tenderness: There is no abdominal tenderness. There is no guarding or rebound.      Hernia: No hernia is present.   Musculoskeletal:      Cervical back: Neck supple.      Right lower leg: No edema.      Left lower leg: No edema.   Skin:     General: Skin is warm and dry.      Capillary Refill: Capillary refill takes less than 2 seconds.   Neurological:      General: No focal deficit present.      Mental Status: He is alert.   Psychiatric:         Mood and Affect: Mood normal.         Behavior: Behavior normal.         Thought Content: Thought content normal.         Judgment: Judgment normal.             ASSESSMENT/PLAN:     Encounter Diagnoses   Name Primary?    Abdominal pressure Yes    Pelvic and perineal pain     Abnormal ultrasound of pelvis        1. Abdominal pressure  - US SCROTUM W/ DOPPLER (CPT=93975/60292); Future  - sulfamethoxazole-trimethoprim -160 MG Oral Tab per tablet; Take 1 tablet by mouth 2 (two) times daily for 28 days.  Dispense: 56 tablet; Refill: 0  - URINALYSIS NONAUTO W/O SCOPE  - Urine Culture, Routine; Future    2. Pelvic and perineal pain  - US  SCROTUM W/ DOPPLER (CPT=93975/18373); Future  - sulfamethoxazole-trimethoprim -160 MG Oral Tab per tablet; Take 1 tablet by mouth 2 (two) times daily for 28 days.  Dispense: 56 tablet; Refill: 0  - URINALYSIS NONAUTO W/O SCOPE  - Urine Culture, Routine; Future    3. Abnormal ultrasound of pelvis  - sulfamethoxazole-trimethoprim -160 MG Oral Tab per tablet; Take 1 tablet by mouth 2 (two) times daily for 28 days.  Dispense: 56 tablet; Refill: 0  - URINALYSIS NONAUTO W/O SCOPE  - Urine Culture, Routine; Future     -Recurrent and now chronic left inguinal & perineal pressure.   -Unknown etiology. Reviewed interval CT, PET, and MRI scans. Possible prostatitis.   -Check urine studies today.   -Prior scrotal ultrasound in 05/2024 was abnormal, and will repeat now per recommendations.   -Again recommend urology evaluation. Info provided for Dr. De La Cruz.   -Will treat for prostatitis in the meantime with bactrim x4 weeks.   -To call for new/worsening symptoms.     Meds This Visit:  Requested Prescriptions     Signed Prescriptions Disp Refills    sulfamethoxazole-trimethoprim -160 MG Oral Tab per tablet 56 tablet 0     Sig: Take 1 tablet by mouth 2 (two) times daily for 28 days.       Imaging & Referrals:  US SCROTUM W/ DOPPLER (MRG=88818/[id number])       Khris Fountain DO  ID#[id number]

## 2024-09-16 ENCOUNTER — APPOINTMENT (OUTPATIENT)
Dept: HEMATOLOGY/ONCOLOGY | Facility: HOSPITAL | Age: 69
End: 2024-09-16
Attending: INTERNAL MEDICINE
Payer: MEDICARE

## 2024-10-11 ENCOUNTER — LAB ENCOUNTER (OUTPATIENT)
Dept: LAB | Facility: REFERENCE LAB | Age: 69
End: 2024-10-11
Attending: FAMILY MEDICINE
Payer: MEDICARE

## 2024-10-11 ENCOUNTER — OFFICE VISIT (OUTPATIENT)
Facility: CLINIC | Age: 69
End: 2024-10-11
Payer: COMMERCIAL

## 2024-10-11 VITALS
HEART RATE: 90 BPM | WEIGHT: 219 LBS | BODY MASS INDEX: 27.23 KG/M2 | HEIGHT: 75 IN | OXYGEN SATURATION: 98 % | DIASTOLIC BLOOD PRESSURE: 76 MMHG | SYSTOLIC BLOOD PRESSURE: 132 MMHG

## 2024-10-11 DIAGNOSIS — R43.2 ABNORMAL SENSE OF TASTE: ICD-10-CM

## 2024-10-11 DIAGNOSIS — R63.0 DECREASED APPETITE: ICD-10-CM

## 2024-10-11 DIAGNOSIS — R63.4 WEIGHT LOSS: ICD-10-CM

## 2024-10-11 DIAGNOSIS — R53.82 CHRONIC FATIGUE: ICD-10-CM

## 2024-10-11 DIAGNOSIS — G47.09 OTHER INSOMNIA: ICD-10-CM

## 2024-10-11 DIAGNOSIS — R93.89 ABNORMAL ULTRASOUND OF PELVIS: ICD-10-CM

## 2024-10-11 DIAGNOSIS — R53.82 CHRONIC FATIGUE: Primary | ICD-10-CM

## 2024-10-11 LAB
ALBUMIN SERPL-MCNC: 4.6 G/DL (ref 3.2–4.8)
ALBUMIN/GLOB SERPL: 1.6 {RATIO} (ref 1–2)
ALP LIVER SERPL-CCNC: 45 U/L
ALT SERPL-CCNC: 12 U/L
ANION GAP SERPL CALC-SCNC: 6 MMOL/L (ref 0–18)
AST SERPL-CCNC: 25 U/L (ref ?–34)
BASOPHILS # BLD AUTO: 0.02 X10(3) UL (ref 0–0.2)
BASOPHILS NFR BLD AUTO: 0.2 %
BILIRUB SERPL-MCNC: 0.6 MG/DL (ref 0.2–1.1)
BUN BLD-MCNC: 16 MG/DL (ref 9–23)
BUN/CREAT SERPL: 18 (ref 10–20)
CALCIUM BLD-MCNC: 9.5 MG/DL (ref 8.7–10.4)
CHLORIDE SERPL-SCNC: 105 MMOL/L (ref 98–112)
CO2 SERPL-SCNC: 29 MMOL/L (ref 21–32)
COMPLEXED PSA SERPL-MCNC: 0.13 NG/ML (ref ?–4)
CREAT BLD-MCNC: 0.89 MG/DL
DEPRECATED RDW RBC AUTO: 52.8 FL (ref 35.1–46.3)
EGFRCR SERPLBLD CKD-EPI 2021: 93 ML/MIN/1.73M2 (ref 60–?)
EOSINOPHIL # BLD AUTO: 0.12 X10(3) UL (ref 0–0.7)
EOSINOPHIL NFR BLD AUTO: 1.3 %
ERYTHROCYTE [DISTWIDTH] IN BLOOD BY AUTOMATED COUNT: 15 % (ref 11–15)
EST. AVERAGE GLUCOSE BLD GHB EST-MCNC: 117 MG/DL (ref 68–126)
FASTING STATUS PATIENT QL REPORTED: NO
FOLATE SERPL-MCNC: 15 NG/ML (ref 5.4–?)
GLOBULIN PLAS-MCNC: 2.8 G/DL (ref 2–3.5)
GLUCOSE BLD-MCNC: 101 MG/DL (ref 70–99)
HBA1C MFR BLD: 5.7 % (ref ?–5.7)
HCT VFR BLD AUTO: 34.4 %
HGB BLD-MCNC: 11.9 G/DL
IMM GRANULOCYTES # BLD AUTO: 0.02 X10(3) UL (ref 0–1)
IMM GRANULOCYTES NFR BLD: 0.2 %
LYMPHOCYTES # BLD AUTO: 6.48 X10(3) UL (ref 1–4)
LYMPHOCYTES NFR BLD AUTO: 67.6 %
MCH RBC QN AUTO: 34.1 PG (ref 26–34)
MCHC RBC AUTO-ENTMCNC: 34.6 G/DL (ref 31–37)
MCV RBC AUTO: 98.6 FL
MONOCYTES # BLD AUTO: 0.52 X10(3) UL (ref 0.1–1)
MONOCYTES NFR BLD AUTO: 5.4 %
NEUTROPHILS # BLD AUTO: 2.42 X10 (3) UL (ref 1.5–7.7)
NEUTROPHILS # BLD AUTO: 2.42 X10(3) UL (ref 1.5–7.7)
NEUTROPHILS NFR BLD AUTO: 25.3 %
OSMOLALITY SERPL CALC.SUM OF ELEC: 291 MOSM/KG (ref 275–295)
PLATELET # BLD AUTO: 136 10(3)UL (ref 150–450)
POTASSIUM SERPL-SCNC: 4.4 MMOL/L (ref 3.5–5.1)
PROT SERPL-MCNC: 7.4 G/DL (ref 5.7–8.2)
RBC # BLD AUTO: 3.49 X10(6)UL
SODIUM SERPL-SCNC: 140 MMOL/L (ref 136–145)
TSI SER-ACNC: 1.33 MIU/ML (ref 0.55–4.78)
VIT B12 SERPL-MCNC: 221 PG/ML (ref 211–911)
VIT D+METAB SERPL-MCNC: 84.5 NG/ML (ref 30–100)
WBC # BLD AUTO: 9.6 X10(3) UL (ref 4–11)

## 2024-10-11 PROCEDURE — 83036 HEMOGLOBIN GLYCOSYLATED A1C: CPT

## 2024-10-11 PROCEDURE — 84443 ASSAY THYROID STIM HORMONE: CPT

## 2024-10-11 PROCEDURE — 80053 COMPREHEN METABOLIC PANEL: CPT

## 2024-10-11 PROCEDURE — 82306 VITAMIN D 25 HYDROXY: CPT

## 2024-10-11 PROCEDURE — 3075F SYST BP GE 130 - 139MM HG: CPT | Performed by: FAMILY MEDICINE

## 2024-10-11 PROCEDURE — 1159F MED LIST DOCD IN RCRD: CPT | Performed by: FAMILY MEDICINE

## 2024-10-11 PROCEDURE — 3008F BODY MASS INDEX DOCD: CPT | Performed by: FAMILY MEDICINE

## 2024-10-11 PROCEDURE — 82607 VITAMIN B-12: CPT

## 2024-10-11 PROCEDURE — 1160F RVW MEDS BY RX/DR IN RCRD: CPT | Performed by: FAMILY MEDICINE

## 2024-10-11 PROCEDURE — 85025 COMPLETE CBC W/AUTO DIFF WBC: CPT

## 2024-10-11 PROCEDURE — 99214 OFFICE O/P EST MOD 30 MIN: CPT | Performed by: FAMILY MEDICINE

## 2024-10-11 PROCEDURE — 3078F DIAST BP <80 MM HG: CPT | Performed by: FAMILY MEDICINE

## 2024-10-11 PROCEDURE — 82746 ASSAY OF FOLIC ACID SERUM: CPT

## 2024-10-11 PROCEDURE — 36415 COLL VENOUS BLD VENIPUNCTURE: CPT

## 2024-10-11 RX ORDER — HYDROXYZINE HYDROCHLORIDE 25 MG/1
25 TABLET, FILM COATED ORAL NIGHTLY PRN
Qty: 60 TABLET | Refills: 0 | Status: SHIPPED | OUTPATIENT
Start: 2024-10-11

## 2024-10-11 NOTE — PROGRESS NOTES
HPI:    Patient ID: Johann Milner is a 69 year old male who presents for decreased appetite and weight loss.    HPI  Feels he is gradually losing weight and has gradually decreased taste.   Taste buds have changed.  Appetite is decreased.   Gets tired very easily. Feels fatigued.   Has left buttock discomfort.   No fevers.   No night sweats, but does get warm at night at times.  Feels like he can lose his balance at times. Notes when walking.  No N/V.   No vision changes.   No headaches.   No numbness or tingling or weakness.   Here with wife today who does agree he has been more easily fatigued and that he is not sleeping well. Gets 3 hours of sleep per night more recently. The sleeping issues started 1-2 months ago. Does not take anything for sleep.   No other associated symptoms.   No abdominal pain. No hematochezia or melena or change in stools.     Of note, he took bactrim x4 weeks after our last appt and symptoms resolved. No current pelvic or perineal symptoms. No new urinary symptoms.     Past Medical History:    Diabetes ([facility])    Dyslipidemia    Essential hypertension    Former smoker    15 pack year history and quit in 2000.    High blood pressure    High cholesterol    Hyperlipidemia    Type 2 diabetes mellitus without complication, without long-term current use of insulin (HCC)        Current Outpatient Medications   Medication Sig Dispense Refill    hydrOXYzine 25 MG Oral Tab Take 1 tablet (25 mg total) by mouth nightly as needed. 60 tablet 0    metFORMIN HCl 1000 MG Oral Tab Take 1 tablet (1,000 mg total) by mouth daily with breakfast. 90 tablet 3    lisinopril 30 MG Oral Tab Take 1 tablet (30 mg total) by mouth daily. 90 tablet 3    simvastatin 40 MG Oral Tab Take 1 tablet (40 mg total) by mouth nightly. 90 tablet 3    amLODIPine 5 MG Oral Tab Take 1 tablet (5 mg total) by mouth daily. 90 tablet 3    ergocalciferol 1.25 MG (09448 UT) Oral Cap Take 1 capsule (50,000 Units total) by mouth once a  week. 15 capsule 2    hydrocortisone (PROCTOSOL HC) 2.5 % External Cream Place 1 Application rectally 2 (two) times daily. 1 each 3        Allergies[1]    Review of Systems   Constitutional:  Positive for appetite change, fatigue and unexpected weight change. Negative for chills, diaphoresis and fever.   Eyes:  Negative for visual disturbance.   Respiratory:  Positive for shortness of breath. Negative for cough, chest tightness and wheezing.    Cardiovascular: Negative.    Gastrointestinal: Negative.    Genitourinary: Negative.    Skin: Negative.    Neurological:  Positive for dizziness and light-headedness. Negative for tremors, syncope, weakness, numbness and headaches.   Psychiatric/Behavioral:  Positive for sleep disturbance.    All other systems reviewed and are negative.           /76   Pulse 90   Ht 6' 3\" (1.905 m)   Wt 219 lb (99.3 kg)   SpO2 98%   BMI 27.37 kg/m²     PHYSICAL EXAM:   Physical Exam  Constitutional:       General: He is not in acute distress.     Appearance: Normal appearance. He is well-developed. He is not ill-appearing, toxic-appearing or diaphoretic.   HENT:      Head: Normocephalic and atraumatic.      Right Ear: Tympanic membrane, ear canal and external ear normal.      Left Ear: Tympanic membrane, ear canal and external ear normal.      Nose: Nose normal.      Mouth/Throat:      Mouth: Mucous membranes are moist.      Pharynx: Oropharynx is clear. No oropharyngeal exudate or posterior oropharyngeal erythema.   Eyes:      Extraocular Movements: Extraocular movements intact.      Conjunctiva/sclera: Conjunctivae normal.   Cardiovascular:      Rate and Rhythm: Normal rate and regular rhythm.      Pulses: Normal pulses.      Heart sounds: Normal heart sounds. No murmur heard.     No friction rub. No gallop.   Pulmonary:      Effort: Pulmonary effort is normal. No respiratory distress.      Breath sounds: Normal breath sounds. No wheezing or rales.   Abdominal:      General:  Bowel sounds are normal. There is no distension.      Palpations: Abdomen is soft. There is no mass.      Tenderness: There is no abdominal tenderness. There is no guarding or rebound.      Hernia: No hernia is present.   Musculoskeletal:      Cervical back: Neck supple.      Right lower leg: No edema.      Left lower leg: No edema.   Lymphadenopathy:      Head:      Right side of head: No submental, submandibular, tonsillar, preauricular, posterior auricular or occipital adenopathy.      Left side of head: No submental, submandibular, tonsillar, preauricular, posterior auricular or occipital adenopathy.      Cervical: No cervical adenopathy.      Upper Body:      Right upper body: No supraclavicular adenopathy.      Left upper body: No supraclavicular adenopathy.   Skin:     General: Skin is warm and dry.      Capillary Refill: Capillary refill takes less than 2 seconds.   Neurological:      General: No focal deficit present.      Mental Status: He is alert. Mental status is at baseline.   Psychiatric:         Mood and Affect: Mood normal.         Behavior: Behavior normal.         Thought Content: Thought content normal.         Judgment: Judgment normal.             ASSESSMENT/PLAN:     Encounter Diagnoses   Name Primary?    Chronic fatigue Yes    Decreased appetite     Weight loss     Abnormal sense of taste     Abnormal ultrasound of pelvis     Other insomnia        1. Chronic fatigue  - Hemoglobin A1C; Future  - CBC With Differential With Platelet; Future  - Comp Metabolic Panel (14); Future  - TSH W Reflex To Free T4; Future  - Vitamin B12 [E]; Future  - Folic Acid Serum (Folate); Future  - Vitamin D; Future  - PSA Total, Screen; Future    2. Decreased appetite  - Hemoglobin A1C; Future  - CBC With Differential With Platelet; Future  - Comp Metabolic Panel (14); Future  - TSH W Reflex To Free T4; Future  - Vitamin B12 [E]; Future  - Folic Acid Serum (Folate); Future  - Vitamin D; Future  - PSA Total, Screen;  Future    3. Weight loss  - Hemoglobin A1C; Future  - CBC With Differential With Platelet; Future  - Comp Metabolic Panel (14); Future  - TSH W Reflex To Free T4; Future  - Vitamin B12 [E]; Future  - Folic Acid Serum (Folate); Future  - Vitamin D; Future  - PSA Total, Screen; Future    4. Abnormal sense of taste  - Hemoglobin A1C; Future  - CBC With Differential With Platelet; Future  - Comp Metabolic Panel (14); Future  - TSH W Reflex To Free T4; Future  - Vitamin B12 [E]; Future  - Folic Acid Serum (Folate); Future  - Vitamin D; Future    5. Abnormal ultrasound of pelvis    6. Other insomnia     -Symptoms are now chronic. Unknown etiology. Insomnia could certainly be contributing, however need to r/o other causes.  -Will update lab panel today.  -Reminded to complete repeat scrotal ultrasound. Info again provided.  -Reviewed last note from visit with Dr. Palacio in 06/2024 who recommended f/u in 3 months. Recommend he schedule.   -Will trial hydroxyzine nightly for insomnia for now.  -Further recs pending lab results.   -To call with new/worsening symptoms.     Meds This Visit:  Requested Prescriptions     Signed Prescriptions Disp Refills    hydrOXYzine 25 MG Oral Tab 60 tablet 0     Sig: Take 1 tablet (25 mg total) by mouth nightly as needed.       Imaging & Referrals:  None       Khris Fountain, DO  ID#[id number]       [1] No Known Allergies

## 2024-10-31 ENCOUNTER — OFFICE VISIT (OUTPATIENT)
Dept: HEMATOLOGY/ONCOLOGY | Facility: HOSPITAL | Age: 69
End: 2024-10-31
Attending: INTERNAL MEDICINE
Payer: MEDICARE

## 2024-10-31 ENCOUNTER — LAB ENCOUNTER (OUTPATIENT)
Dept: LAB | Facility: HOSPITAL | Age: 69
End: 2024-10-31
Attending: INTERNAL MEDICINE
Payer: MEDICARE

## 2024-10-31 VITALS
TEMPERATURE: 98 F | DIASTOLIC BLOOD PRESSURE: 73 MMHG | HEIGHT: 75 IN | HEART RATE: 94 BPM | BODY MASS INDEX: 27.21 KG/M2 | SYSTOLIC BLOOD PRESSURE: 164 MMHG | OXYGEN SATURATION: 100 % | RESPIRATION RATE: 16 BRPM | WEIGHT: 218.88 LBS

## 2024-10-31 DIAGNOSIS — C91.10 CLL (CHRONIC LYMPHOCYTIC LEUKEMIA) (HCC): Primary | ICD-10-CM

## 2024-10-31 DIAGNOSIS — D64.9 NORMOCYTIC ANEMIA: ICD-10-CM

## 2024-10-31 DIAGNOSIS — C91.10 CLL (CHRONIC LYMPHOCYTIC LEUKEMIA) (HCC): ICD-10-CM

## 2024-10-31 DIAGNOSIS — D69.6 THROMBOCYTOPENIA (HCC): ICD-10-CM

## 2024-10-31 DIAGNOSIS — Z87.39 HISTORY OF GOUT: ICD-10-CM

## 2024-10-31 LAB — LDH SERPL L TO P-CCNC: 132 U/L

## 2024-10-31 PROCEDURE — 99214 OFFICE O/P EST MOD 30 MIN: CPT | Performed by: INTERNAL MEDICINE

## 2024-10-31 PROCEDURE — 83615 LACTATE (LD) (LDH) ENZYME: CPT

## 2024-10-31 PROCEDURE — 36415 COLL VENOUS BLD VENIPUNCTURE: CPT

## 2024-10-31 NOTE — PROGRESS NOTES
Cancer Center Progress Note    Patient Name: Johann Milner   YOB: 1955   Medical Record Number: [id number]     Chief Complaint:  CLL     Oncology History:  69 year old seen prior history of CLL (2017) with normal WBC, lost to follow up, presenting with elevated WBC and ALC consistent with CLL, Normal FISH    10/31/2024  Here for follow up of CLL. Johann does not endorse fatigue. 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 Pt mentions his appetite and weight are stable to improved. Pt has no systemic signs of illness. No other concerns on ROS.    PMH: CLL, HTN, DM  Family History: three sisters with breast cancer. father with bone cancer.   Social History: lives with wife, 3 kids. Quit> 20 years. retired from  at     Current Outpatient Medications:     hydrOXYzine 25 MG Oral Tab, Take 1 tablet (25 mg total) by mouth nightly as needed., Disp: 60 tablet, Rfl: 0    metFORMIN HCl 1000 MG Oral Tab, Take 1 tablet (1,000 mg total) by mouth daily with breakfast., Disp: 90 tablet, Rfl: 3    lisinopril 30 MG Oral Tab, Take 1 tablet (30 mg total) by mouth daily., Disp: 90 tablet, Rfl: 3    simvastatin 40 MG Oral Tab, Take 1 tablet (40 mg total) by mouth nightly., Disp: 90 tablet, Rfl: 3    amLODIPine 5 MG Oral Tab, Take 1 tablet (5 mg total) by mouth daily., Disp: 90 tablet, Rfl: 3    ergocalciferol 1.25 MG (42320 UT) Oral Cap, Take 1 capsule (50,000 Units total) by mouth once a week., Disp: 15 capsule, Rfl: 2    hydrocortisone (PROCTOSOL HC) 2.5 % External Cream, Place 1 Application rectally 2 (two) times daily., Disp: 1 each, Rfl: 3    No Known Allergies     Review of Systems: Oncology specific ROS negative except as per HPI    BP (!) 164/73 (BP Location: Left arm, Patient Position: Sitting, Cuff Size: adult)   Pulse 94   Temp 98.4 °F (36.9 °C) (Oral)   Resp 16   Ht 1.905 m (6' 3\")   Wt 99.3 kg (218 lb 14.4 oz)   SpO2 100%   BMI 27.36  kg/m²   Wt Readings from Last 6 Encounters:   10/31/24 99.3 kg (218 lb 14.4 oz)   10/11/24 99.3 kg (219 lb)   08/21/24 100.2 kg (221 lb)   06/14/24 98.7 kg (217 lb 8 oz)   05/16/24 102.1 kg (225 lb)   04/25/24 100.2 kg (221 lb)   General: Patient is alert and oriented x 3, not in acute distress.  HEENT: EOMs intact.  Non icterus, no palor  Neck: ROM intact. No adenopathy  Chest: Clear to lungs bilaterally  Abdomen: Soft, non tender. ND; no appreciable hepatosplenomegaly  Extremities: No edema, cyanosis, or bruising  Neurological: motor strength grossly intact, MA4E  Psych: appropriate mood and affect      Imaging:  MRI abdominal imaging from 7/2024 reviewed    Impression and Plan:    1.) CLL, stage 0, FISH negative, low risk    -- Patient has no systemic signs of illness.  Less concern for malignant potential from this gastrohepatic lymph node based on SUV of only 4.14 and liver being 4.16.    --CT scan reviewed and PET scan reviewed at tumor board  -- pt was rec for MRI abdomen for further evaluation of this gastrohepatic lymph node; discussed with radiology in July and this is felt to be a normal variation of the caudate lobe of liver, informed pt today  -- f/u in 6 mo as I see small signs of anemia and thrombocytopenia that I want to follow as those could be indications for tx of his CLL    2.) History of gout- ok to do allopurinol for gout txt per pcp if indicated, does not need it for CLL    MDM: Moderate    Jhon Palacio MD  [facility]  Rebecca JODI [address], [place] [address]

## 2024-11-12 ENCOUNTER — HOSPITAL ENCOUNTER (OUTPATIENT)
Dept: ULTRASOUND IMAGING | Age: 69
Discharge: HOME | End: 2024-11-12
Attending: FAMILY MEDICINE
Payer: MEDICARE

## 2024-11-12 DIAGNOSIS — R10.9 ABDOMINAL PRESSURE: ICD-10-CM

## 2024-11-12 DIAGNOSIS — R10.2 PELVIC AND PERINEAL PAIN: ICD-10-CM

## 2024-11-12 PROCEDURE — 93975 VASCULAR STUDY: CPT | Performed by: FAMILY MEDICINE

## 2024-11-12 PROCEDURE — 76870 US EXAM SCROTUM: CPT | Performed by: FAMILY MEDICINE

## 2024-12-23 ENCOUNTER — OFFICE VISIT (OUTPATIENT)
Dept: SURGERY | Facility: CLINIC | Age: 69
End: 2024-12-23
Payer: COMMERCIAL

## 2024-12-23 VITALS — SYSTOLIC BLOOD PRESSURE: 144 MMHG | DIASTOLIC BLOOD PRESSURE: 66 MMHG | HEART RATE: 103 BPM

## 2024-12-23 DIAGNOSIS — R93.812 ABNORMAL FINDING ON DIAGNOSTIC IMAGING OF LEFT TESTICLE: Primary | ICD-10-CM

## 2024-12-23 PROCEDURE — 99204 OFFICE O/P NEW MOD 45 MIN: CPT | Performed by: UROLOGY

## 2024-12-23 PROCEDURE — 3077F SYST BP >= 140 MM HG: CPT | Performed by: UROLOGY

## 2024-12-23 PROCEDURE — 1159F MED LIST DOCD IN RCRD: CPT | Performed by: UROLOGY

## 2024-12-23 PROCEDURE — 3078F DIAST BP <80 MM HG: CPT | Performed by: UROLOGY

## 2024-12-23 NOTE — PROGRESS NOTES
[facility] Urology  Initial Office Consultation    HPI:   Johann Milner is a 69 year old male here today for consultation at the request of, and a copy of this note will be sent to, Khris Fountain DO.  Accompanied by his wife.    1.  Abnormal ultrasound finding of the left testicle  Patient had a scrotal ultrasound ordered by his PCP and completed 5/5/2024 for evaluation of suprapubic/pubic pain.    This revealed a heterogeneous left testicular echogenicity, which may be sequela of a prior episode of testicular torsion or prior infectious/inflammatory process.  Urology consultation recommended with follow-up in 3 months.  No testicular torsion.  No evidence of epididymitis.    This was followed up with another ultrasound of the scrotum 11/12/2024 which revealed again a heterogeneous echogenicity of the left testicle with more focal hypoechogenicity at the superior anterior aspect.  This may suggest sequela of previous inflammation or infarction.  No well-defined mass.  Follow-up study in 6 months advised to document stability.    Patient does not recall any history of trauma or testicular torsion.  No family history of testicular or prostate cancer.  He denies any pain or discomfort in left testicle.  No swelling.        PAST MEDICAL HISTORY: Diabetes.  Hyperlipidemia.  Hypertension.  CLL.    PAST SURGICAL HISTORY: Knee arthroscopy.  Hernia surgery.    SOCIAL HISTORY:  and has 3 children. Former smoker.  15-pack-year history and quit in 2000. Drinks alcohol 4 times a week.  He works as a  for [employer].    Family History   Problem Relation Age of Onset    Cancer Father     Genetic Disease Mother     Heart Disease Mother     Cancer Maternal Grandmother      Allergies: Patient has no known allergies.      REVIEW OF SYSTEMS:  Pertinent positives and negatives per HPI. A 12-point ROS was performed and is otherwise negative.       EXAM:  /66 (BP  Location: Left arm, Patient Position: Sitting, Cuff Size: adult)   Pulse 103     Physical Exam  Constitutional:       Appearance: He is well-developed.   HENT:      Head: Normocephalic.   Eyes:      General: No scleral icterus.  Cardiovascular:      Rate and Rhythm: Normal rate.   Pulmonary:      Effort: Pulmonary effort is normal.   Genitourinary:     Penis: Uncircumcised.       Testes: Normal.         Right: Mass, tenderness or swelling not present.         Left: Mass, tenderness or swelling not present.      Epididymis:      Right: Normal.      Left: Normal.   Skin:     General: Skin is warm and dry.   Neurological:      Mental Status: He is alert and oriented to person, place, and time.   Psychiatric:         Mood and Affect: Mood normal.         Behavior: Behavior normal.       LABS:  No results found.      IMAGING:    US SCROTUM W/ DOPPLER (CPT=93975/00462)    Result Date: 11/12/2024  CONCLUSION:  1. Again noted is heterogeneous echogenicity of the left testicle with more focal hypoechogenicity at its superior and anterior aspect similar to that seen previously.  This may suggest sequelae of previous inflammation or infarction.  No well-defined mass.  Follow-up study in 6 months advised to document its stability.  Normal left epididymis. 2. Normal right testicle and epididymis.  Trace right scrotal hydrocele.    Dictated by (CST): Kaz Emerson MD on 11/12/2024 at 4:57 PM     Finalized by (CST): Kaz Emerson MD on 11/12/2024 at 5:03 PM           Renal ultrasound (5/5/2024): Heterogeneous left testicular echogenicity, which may be sequela of a prior episode of testicular torsion or prior infectious/inflammatory process.  Consider urology consultation and recommend continued attention on follow-up ultrasound in 3 months. No evidence of acute testicular torsion. No evidence of epididymitis, however the left epididymis is poorly identified.         IMPRESSION:  69 year old male with incidental finding of a  heterogeneous appearance of the left testicle.  No focal masses or lesions identified.    Reviewed with patient and wife at length.  Exam today is unremarkable.  Left testicle slightly smaller than the right.    Differential diagnoses discussed including benign and malignant etiologies.  I would favor a benign etiology.    We discussed obtaining serum testicular tumor markers including AFP and hCG.  If normal, would recommend continued follow-up with another ultrasound in 6 months and follow-up thereafter.    Patient encouraged to perform testicular self exams and let us know if he feels anything new or different.    Patient agreeable.  All questions answered.      PLAN:  1.  Check serum alpha-fetoprotein and hCG tumor markers.  If normal, follow-up in 6 months with an updated ultrasound of the testicles prior to visit.    Sunny Venegas MD  12/23/2024

## 2024-12-26 RX ORDER — SIMVASTATIN 40 MG
40 TABLET ORAL NIGHTLY
Qty: 100 TABLET | Refills: 0 | Status: SHIPPED | OUTPATIENT
Start: 2024-12-26

## 2024-12-26 NOTE — TELEPHONE ENCOUNTER
Refill passed per [facility] protocol.   Requested Prescriptions   Pending Prescriptions Disp Refills    simvastatin 40 MG Oral Tab [Pharmacy Med Name: Simvastatin 40 MG Oral Tablet] 100 tablet 0     Sig: TAKE 1 TABLET BY MOUTH EVERY  NIGHT       Cholesterol Medication Protocol Passed - 12/26/2024  2:36 PM        Passed - ALT < 80     Lab Results   Component Value Date    ALT 12 10/11/2024             Passed - ALT resulted within past year        Passed - Lipid panel within past 12 months     Lab Results   Component Value Date    CHOLEST 180 03/28/2024    TRIG 76 03/28/2024    HDL 80 (H) 03/28/2024    LDL 86 03/28/2024    VLDL 12 03/28/2024    NONHDLC 100 03/28/2024             Passed - In person appointment or virtual visit in the past 12 mos or appointment in next 3 mos     Recent Outpatient Visits              3 days ago Abnormal finding on diagnostic imaging of left testicle    [facility] Sunny Venegas MD    Office Visit    1 month ago CLL (chronic lymphocytic leukemia) ([facility])    [facility] Hematology Oncology Jhon Palacio MD    Office Visit    2 months ago Chronic fatigue    [facility] Khris Fountain DO    Office Visit    4 months ago Abdominal pressure    [facility]    Office Visit    6 months ago CLL (chronic lymphocytic leukemia) ([facility])    [facility]ematology Oncology Jhon Palacio MD    Office Visit          Future Appointments         Provider Department Appt Notes    In 4 months Jhon Palacio MD [facility] H[facility]ology On[facility] [place] 6 month f/u                        Recent Outpatient Visits              3 days ago Abnormal finding on diagnostic imaging of left testicle    [facility] Sunny Venegas MD    Office Visit    1 month ago CLL (chronic  lymphocytic leukemia) ([facility])    [facility] Hematology Oncology Jhon Palacio MD    Office Visit    2 months ago Chronic fatigue    [facility] Khris Fountain DO    Office Visit    4 months ago Abdominal pressure    [facility] Khris Fountain DO    Office Visit    6 months ago CLL (chronic lymphocytic leukemia) ([facility])    [facility] Hematology Oncology Jhon Palacio MD    Office Visit           Future Appointments         Provider Department Appt Notes    In 4 months Jhon Palacio MD [facility] Oncology [place] 6 month f/u

## 2025-01-03 RX ORDER — LISINOPRIL 30 MG/1
30 TABLET ORAL DAILY
Qty: 100 TABLET | Refills: 2 | Status: SHIPPED | OUTPATIENT
Start: 2025-01-03

## 2025-01-03 NOTE — TELEPHONE ENCOUNTER
Please review; protocol failed/No Protocol    Requested Prescriptions   Pending Prescriptions Disp Refills    LISINOPRIL 30 MG Oral Tab [Pharmacy Med Name: Lisinopril 30 MG Oral Tablet] 100 tablet 2     Sig: TAKE 1 TABLET BY MOUTH DAILY       Hypertension Medications Protocol Failed - 1/3/2025  8:51 AM        Failed - Last BP reading less than 140/90     BP Readings from Last 1 Encounters:   12/23/24 144/66               Passed - CMP or BMP in past 12 months        Passed - In person appointment or virtual visit in the past 12 mos or appointment in next 3 mos     Recent Outpatient Visits              1 week ago Abnormal finding on diagnostic imaging of left testicle    [facility] Sunny Venegas MD    Office Visit    2 months ago CLL (chronic lymphocytic leukemia) ([facility])    [facility]ematology On[facility] Jhon Palacio MD    Office Visit    2 months ago [facility]    4 [facility]    [facility]    6 months ago CLL (chronic lymphocytic leukemia) ([facility])    [facility] Hematology Oncology Jhon Palacio MD    Office Visit          Future Appointments         Provider Department Appt Notes    In 3 months Jhon Palacio MD [facility] Hematology Oncology [place] 6 month f/u                    Passed - EGFRCR or GFRAA > 50     GFR Evaluation  EGFRCR: 93 , resulted on 10/11/2024             Future Appointments         Provider Department Appt Notes    In 3 months Jhon Palacio MD [facility] Hematology Oncology [place] 6 month f/u          Recent Outpatient Visits              1 week ago Abnormal finding on diagnostic imaging of left testicle    [facility]  Sunny Venegas MD    Office Visit    2 months ago CLL (chronic lymphocytic leukemia) ([facility])    [facility] Hematology Oncology Jhon Palacio MD    Office Visit    2 months ago [facility]    [facility]    6 months ago CLL (chronic lymphocytic leukemia) ([facility])    [facility] Hematology Oncology Jhon Palacio MD    Office Visit

## 2025-01-23 ENCOUNTER — TELEPHONE (OUTPATIENT)
Facility: CLINIC | Age: 70
End: 2025-01-23

## 2025-01-23 NOTE — TELEPHONE ENCOUNTER
1st Attempt:1/23/25    Outreach patient to help schedule Medicare Annual Super Visit. Per patient is going out of town and was unable to schedule at time of the call and is going to call back upon patient return.        Last visit: 03/28/24.

## 2025-02-14 NOTE — TELEPHONE ENCOUNTER
REFILL PASSED PER [facility] PROTOCOLS    Requested Prescriptions   Pending Prescriptions Disp Refills    METFORMIN HCL 1000 MG Oral Tab [Pharmacy Med Name: metFORMIN HCl 1000 MG Oral Tablet] 100 tablet 2     Sig: TAKE 1 TABLET BY MOUTH DAILY  WITH BREAKFAST       Diabetes Medication Protocol Passed - 2/14/2025  2:36 PM        Passed - Last A1C < 7.5 and within past 6 months     Lab Results   Component Value Date    A1C 5.7 (H) 10/11/2024             Passed - In person appointment or virtual visit in the past 6 mos or appointment in next 3 mos     Recent Outpatient Visits              1 month ago Abnormal finding on diagnostic imaging of left testicle    [facility] Sunny Venegas MD    Office Visit    3 months ago CLL (chronic lymphocytic leukemia) ([facility])    [facility] Hematology Oncology Jhon Palacio MD    Office Visit    4 months ago [facility]    5 months ago Abdominal pressure    [facility]    Office Visit    8 months ago CLL (chronic lymphocytic leukemia) ([facility])    [facility] Hematology Oncology Jhon Palacio MD    Office Visit          Future Appointments         Provider Department Appt Notes    In 2 months Jhon Palacio MD [facility]ology On[facility] [place] 6 month f/u                    Passed - Microalbumin procedure in past 12 months or taking ACE/ARB        Passed - EGFRCR or GFRAA > 50     GFR Evaluation  EGFRCR: 93 , resulted on 10/11/2024          Passed - GFR in the past 12 months        Passed - Medication is active on med list             Future Appointments         Provider Department Appt Notes    In 2 months Jhon Palacio MD [facility] H[facility] Oncology [place] 6 month f/u           Recent Outpatient Visits              1 month ago Abnormal finding on diagnostic imaging of left testicle    [facility] Sunny Venegas MD    Office Visit    3 months ago CLL (chronic lymphocytic leukemia) ([facility])    [facility] Hematology Oncology Jhon Palacio MD    Office Visit    4 months ago Chronic fatigue    [facility] Khris Fountain DO    Office Visit    5 months ago Abdominal pressure    [facility] Khris Fountain DO    Office Visit    8 months ago CLL (chronic lymphocytic leukemia) ([facility])    [facility] Hematology Oncology Jhon Palacio MD    Office Visit

## 2025-03-05 RX ORDER — SIMVASTATIN 40 MG
40 TABLET ORAL NIGHTLY
Qty: 90 TABLET | Refills: 3 | Status: SHIPPED | OUTPATIENT
Start: 2025-03-05

## 2025-05-01 ENCOUNTER — OFFICE VISIT (OUTPATIENT)
Facility: CLINIC | Age: 70
End: 2025-05-01
Payer: COMMERCIAL

## 2025-05-01 ENCOUNTER — APPOINTMENT (OUTPATIENT)
Age: 70
End: 2025-05-01
Attending: INTERNAL MEDICINE
Payer: MEDICARE

## 2025-05-01 ENCOUNTER — LAB ENCOUNTER (OUTPATIENT)
Dept: LAB | Age: 70
End: 2025-05-01
Attending: FAMILY MEDICINE
Payer: MEDICARE

## 2025-05-01 VITALS
HEART RATE: 76 BPM | SYSTOLIC BLOOD PRESSURE: 124 MMHG | BODY MASS INDEX: 27 KG/M2 | WEIGHT: 219 LBS | DIASTOLIC BLOOD PRESSURE: 66 MMHG | OXYGEN SATURATION: 98 %

## 2025-05-01 DIAGNOSIS — E11.9 TYPE 2 DIABETES MELLITUS WITHOUT COMPLICATION, WITHOUT LONG-TERM CURRENT USE OF INSULIN (HCC): ICD-10-CM

## 2025-05-01 DIAGNOSIS — E78.5 DYSLIPIDEMIA: ICD-10-CM

## 2025-05-01 DIAGNOSIS — I10 ESSENTIAL HYPERTENSION: ICD-10-CM

## 2025-05-01 DIAGNOSIS — Z00.00 ENCOUNTER FOR ANNUAL HEALTH EXAMINATION: Primary | ICD-10-CM

## 2025-05-01 DIAGNOSIS — R53.82 CHRONIC FATIGUE: ICD-10-CM

## 2025-05-01 DIAGNOSIS — R93.89 ABNORMAL ULTRASOUND OF PELVIS: ICD-10-CM

## 2025-05-01 DIAGNOSIS — Z87.891 FORMER SMOKER: ICD-10-CM

## 2025-05-01 DIAGNOSIS — E55.9 VITAMIN D DEFICIENCY: ICD-10-CM

## 2025-05-01 DIAGNOSIS — R93.5 ABNORMAL CT OF THE ABDOMEN: ICD-10-CM

## 2025-05-01 DIAGNOSIS — F51.04 CHRONIC INSOMNIA: ICD-10-CM

## 2025-05-01 DIAGNOSIS — Z00.00 ENCOUNTER FOR ANNUAL HEALTH EXAMINATION: ICD-10-CM

## 2025-05-01 DIAGNOSIS — N52.9 ERECTILE DYSFUNCTION, UNSPECIFIED ERECTILE DYSFUNCTION TYPE: ICD-10-CM

## 2025-05-01 DIAGNOSIS — C91.10 CLL (CHRONIC LYMPHOCYTIC LEUKEMIA) (HCC): ICD-10-CM

## 2025-05-01 LAB
ALBUMIN SERPL-MCNC: 4.6 G/DL (ref 3.2–4.8)
ALBUMIN/GLOB SERPL: 1.5 {RATIO} (ref 1–2)
ALP LIVER SERPL-CCNC: 51 U/L (ref 45–117)
ALT SERPL-CCNC: 12 U/L (ref 10–49)
ANION GAP SERPL CALC-SCNC: 9 MMOL/L (ref 0–18)
AST SERPL-CCNC: 24 U/L (ref ?–34)
BASOPHILS # BLD AUTO: 0.03 X10(3) UL (ref 0–0.2)
BASOPHILS NFR BLD AUTO: 0.3 %
BILIRUB SERPL-MCNC: 0.4 MG/DL (ref 0.2–1.1)
BUN BLD-MCNC: 14 MG/DL (ref 9–23)
BUN/CREAT SERPL: 14.1 (ref 10–20)
CALCIUM BLD-MCNC: 9.3 MG/DL (ref 8.7–10.4)
CHLORIDE SERPL-SCNC: 102 MMOL/L (ref 98–112)
CHOLEST SERPL-MCNC: 163 MG/DL (ref ?–200)
CO2 SERPL-SCNC: 27 MMOL/L (ref 21–32)
CREAT BLD-MCNC: 0.99 MG/DL (ref 0.7–1.3)
CREAT UR-SCNC: 78.4 MG/DL
DEPRECATED RDW RBC AUTO: 54.4 FL (ref 35.1–46.3)
EGFRCR SERPLBLD CKD-EPI 2021: 82 ML/MIN/1.73M2 (ref 60–?)
EOSINOPHIL # BLD AUTO: 0.05 X10(3) UL (ref 0–0.7)
EOSINOPHIL NFR BLD AUTO: 0.4 %
ERYTHROCYTE [DISTWIDTH] IN BLOOD BY AUTOMATED COUNT: 14.9 % (ref 11–15)
EST. AVERAGE GLUCOSE BLD GHB EST-MCNC: 108 MG/DL (ref 68–126)
FASTING PATIENT LIPID ANSWER: NO
FASTING STATUS PATIENT QL REPORTED: NO
GLOBULIN PLAS-MCNC: 3 G/DL (ref 2–3.5)
GLUCOSE BLD-MCNC: 96 MG/DL (ref 70–99)
HBA1C MFR BLD: 5.4 % (ref ?–5.7)
HCT VFR BLD AUTO: 36.4 % (ref 39–53)
HDLC SERPL-MCNC: 62 MG/DL (ref 40–59)
HGB BLD-MCNC: 12 G/DL (ref 13–17.5)
IMM GRANULOCYTES # BLD AUTO: 0.01 X10(3) UL (ref 0–1)
IMM GRANULOCYTES NFR BLD: 0.1 %
LDLC SERPL CALC-MCNC: 86 MG/DL (ref ?–100)
LYMPHOCYTES # BLD AUTO: 9.03 X10(3) UL (ref 1–4)
LYMPHOCYTES NFR BLD AUTO: 78.9 %
MCH RBC QN AUTO: 32.7 PG (ref 26–34)
MCHC RBC AUTO-ENTMCNC: 33 G/DL (ref 31–37)
MCV RBC AUTO: 99.2 FL (ref 80–100)
MICROALBUMIN UR-MCNC: <0.3 MG/DL
MONOCYTES # BLD AUTO: 0.64 X10(3) UL (ref 0.1–1)
MONOCYTES NFR BLD AUTO: 5.6 %
NEUTROPHILS # BLD AUTO: 1.68 X10 (3) UL (ref 1.5–7.7)
NEUTROPHILS # BLD AUTO: 1.68 X10(3) UL (ref 1.5–7.7)
NEUTROPHILS NFR BLD AUTO: 14.7 %
NONHDLC SERPL-MCNC: 101 MG/DL (ref ?–130)
OSMOLALITY SERPL CALC.SUM OF ELEC: 286 MOSM/KG (ref 275–295)
PLATELET # BLD AUTO: 153 10(3)UL (ref 150–450)
POTASSIUM SERPL-SCNC: 3.8 MMOL/L (ref 3.5–5.1)
PROT SERPL-MCNC: 7.6 G/DL (ref 5.7–8.2)
RBC # BLD AUTO: 3.67 X10(6)UL (ref 3.8–5.8)
SODIUM SERPL-SCNC: 138 MMOL/L (ref 136–145)
TRIGL SERPL-MCNC: 80 MG/DL (ref 30–149)
VIT D+METAB SERPL-MCNC: 68.5 NG/ML (ref 30–100)
VLDLC SERPL CALC-MCNC: 13 MG/DL (ref 0–30)
WBC # BLD AUTO: 11.4 X10(3) UL (ref 4–11)

## 2025-05-01 PROCEDURE — 82570 ASSAY OF URINE CREATININE: CPT

## 2025-05-01 PROCEDURE — 1125F AMNT PAIN NOTED PAIN PRSNT: CPT | Performed by: FAMILY MEDICINE

## 2025-05-01 PROCEDURE — 82043 UR ALBUMIN QUANTITATIVE: CPT

## 2025-05-01 PROCEDURE — 3078F DIAST BP <80 MM HG: CPT | Performed by: FAMILY MEDICINE

## 2025-05-01 PROCEDURE — 96160 PT-FOCUSED HLTH RISK ASSMT: CPT | Performed by: FAMILY MEDICINE

## 2025-05-01 PROCEDURE — 80053 COMPREHEN METABOLIC PANEL: CPT

## 2025-05-01 PROCEDURE — 1170F FXNL STATUS ASSESSED: CPT | Performed by: FAMILY MEDICINE

## 2025-05-01 PROCEDURE — 1160F RVW MEDS BY RX/DR IN RCRD: CPT | Performed by: FAMILY MEDICINE

## 2025-05-01 PROCEDURE — 85025 COMPLETE CBC W/AUTO DIFF WBC: CPT

## 2025-05-01 PROCEDURE — 36415 COLL VENOUS BLD VENIPUNCTURE: CPT

## 2025-05-01 PROCEDURE — 82306 VITAMIN D 25 HYDROXY: CPT

## 2025-05-01 PROCEDURE — 80061 LIPID PANEL: CPT

## 2025-05-01 PROCEDURE — 3074F SYST BP LT 130 MM HG: CPT | Performed by: FAMILY MEDICINE

## 2025-05-01 PROCEDURE — G0439 PPPS, SUBSEQ VISIT: HCPCS | Performed by: FAMILY MEDICINE

## 2025-05-01 PROCEDURE — 1159F MED LIST DOCD IN RCRD: CPT | Performed by: FAMILY MEDICINE

## 2025-05-01 PROCEDURE — 99214 OFFICE O/P EST MOD 30 MIN: CPT | Performed by: FAMILY MEDICINE

## 2025-05-01 PROCEDURE — 85060 BLOOD SMEAR INTERPRETATION: CPT

## 2025-05-01 PROCEDURE — 83036 HEMOGLOBIN GLYCOSYLATED A1C: CPT

## 2025-05-01 NOTE — PROGRESS NOTES
Subjective:   Johann Milner is a 70 year old male who presents for a MA AHA (Medicare Advantage Annual Health Assessment) and Subsequent Annual Wellness visit (Pt already had Initial Annual Wellness) and scheduled follow up of multiple significant but stable problems.   History of Present Illness              Still feels fatigue on most days of the night. Saw Dr. Palacio in 10/2024.     Insomnia: Hydroxyzine 25mg nightly helps, but still wakes up at night.     Saw urology Dr. Venegas in 12/2024 for abnormal scrotal ultrasound. Ordered labs that were not completed.     Takes miralax prn for constipation and works well.     HTN: Taking lisinopril and amlodipine w/o issues.      HLD: Taking simvastatin w/o issues.       DM: Taking metformin 1000mg daily w/o issues. Diet is good. Less exercise.     Vit D deficiency: Took 50K units once weekly. No current supplement.     Diet & Exercise: see above  Last colonoscopy: 09/2022 with Dr. Osorio showing one polyp, diverticulosis, and hemorrhoids. Rec'd repeat in 5 years.   PSA: normal in 10/2024  Hx of tobacco use. 15 pack year history and quit in 2000.   AAA screening: Negative in 04/2021  Vaccines: UTD      History/Other:   Fall Risk Assessment:   He has been screened for Falls and is low risk.      Cognitive Assessment:   He had a completely normal cognitive assessment - see flowsheet entries     Functional Ability/Status:   Johann Milner has a completely normal functional assessment. See flowsheet for details.      Depression Screening (PHQ):  PHQ-2 SCORE: 0  , done 4/28/2025             Advanced Directives:   He does NOT have a Living Will. [Do you have a living will?: (Patient-Rptd) No]  He does NOT have a Power of  for Health Care. [Do you have a healthcare power of ?: (Patient-Rptd) No]  Not discussed      Patient Active Problem List   Diagnosis    Dyslipidemia    Type 2 diabetes mellitus without complication, without long-term current use of  insulin (HCC)    Essential hypertension    Erectile dysfunction    Former smoker    Vitamin D deficiency    CLL (chronic lymphocytic leukemia) (HCC)    Abnormal CT of the abdomen    Abnormal ultrasound of pelvis    Chronic fatigue    Chronic insomnia     Allergies:  He has no known allergies.    Current Medications:  Outpatient Medications Marked as Taking for the 5/1/25 encounter (Office Visit) with Khris Fountain, DO   Medication Sig    simvastatin 40 MG Oral Tab Take 1 tablet (40 mg total) by mouth nightly.    metFORMIN HCl 1000 MG Oral Tab Take 1 tablet (1,000 mg total) by mouth daily with breakfast.    lisinopril 30 MG Oral Tab Take 1 tablet (30 mg total) by mouth daily.    hydrOXYzine 25 MG Oral Tab Take 1 tablet (25 mg total) by mouth nightly as needed.    amLODIPine 5 MG Oral Tab Take 1 tablet (5 mg total) by mouth daily.       Medical History:  He  has a past medical history of Diabetes ([facility]), Dyslipidemia, Essential hypertension, Former smoker (2000), High blood pressure, High cholesterol, Hyperlipidemia (2015), and Type 2 diabetes mellitus without complication, without long-term current use of insulin ([facility]).  Surgical History:  He  has a past surgical history that includes hernia surgery; knee arthroscopy; colonoscopy (N/A, 09/21/2022); and egd (N/A, 05/20/2024).   Family History:  His family history includes Cancer in his father and maternal grandmother; Genetic Disease in his mother; Heart Disease in his mother.  Social History:  He  reports that he quit smoking about 25 years ago. His smoking use included cigarettes. He started smoking about 45 years ago. He has a 15 pack-year smoking history. He has never used smokeless tobacco. He reports current alcohol use of about 13.0 standard drinks of alcohol per week. He reports that he does not use drugs.    Tobacco:  He smoked tobacco in the past but quit greater than 12 months ago.  Tobacco Use[1]     CAGE Alcohol Screen:   CAGE screening score of 0 on  4/28/2025, showing low risk of alcohol abuse.      Patient Care Team:  Khris Fountain DO as PCP - General (Family Medicine)    Review of Systems  GENERAL: feels well otherwise. See HPI  SKIN: denies any unusual skin lesions  EYES: denies blurred vision or double vision  HEENT: denies nasal congestion, sinus pain or ST  LUNGS: denies shortness of breath with exertion  CARDIOVASCULAR: denies chest pain on exertion  GI: denies abdominal pain, denies heartburn  : no complaint of urinary incontinence  MUSCULOSKELETAL: denies back pain  NEURO: denies headaches  PSYCHE: denies depression or anxiety  HEMATOLOGIC: see HPI  ENDOCRINE: denies thyroid history  ALL/ASTHMA: denies hx of allergy or asthma    Objective:   Physical Exam  General Appearance:  Alert, cooperative, no distress, appears stated age   Head:  Normocephalic, without obvious abnormality, atraumatic   Eyes:  PERRL, conjunctiva/corneas clear, EOM's intact, both eyes   Ears:  Normal TM's and external ear canals, both ears   Nose: Nares normal, septum midline, mucosa normal, no drainage or sinus tenderness   Throat: Lips, mucosa, and tongue normal; teeth and gums normal   Neck: Supple, symmetrical, trachea midline, no adenopathy, thyroid: not enlarged, symmetric, no tenderness/mass/nodules, no carotid bruit or JVD   Back:   Symmetric, no curvature, ROM normal, no CVA tenderness   Lungs:   Clear to auscultation bilaterally, respirations unlabored   Chest Wall:  No tenderness or deformity   Heart:  Regular rate and rhythm, S1, S2 normal, no murmur, rub or gallop   Abdomen:   Soft, non-tender, bowel sounds active all four quadrants,  no masses, no organomegaly           Extremities: Extremities normal, atraumatic, no cyanosis or edema   Pulses: 2+ and symmetric   Skin: Skin color, texture, turgor normal, no rashes or lesions   Lymph nodes: Cervical, supraclavicular, and axillary nodes normal   Neurologic: Normal     /66   Pulse 76   Wt 219 lb (99.3 kg)    SpO2 98%   BMI 27.37 kg/m²  Estimated body mass index is 27.37 kg/m² as calculated from the following:    Height as of 10/31/24: 6' 3\" (1.905 m).    Weight as of this encounter: 219 lb (99.3 kg).    Medicare Hearing Assessment:   Hearing Screening    Screening Method: Finger Rub  Finger Rub Result: Pass         Visual Acuity:   Right Eye Visual Acuity: Uncorrected Right Eye Chart Acuity: 20/50   Left Eye Visual Acuity: Uncorrected Left Eye Chart Acuity: 20/40   Both Eyes Visual Acuity: Uncorrected Both Eyes Chart Acuity: 20/40   Able To Tolerate Visual Acuity: Yes        Assessment & Plan:   Johann Milner is a 70 year old male who presents for a Medicare Assessment.     1. Encounter for annual health examination (Primary)  -     Hemoglobin A1C; Future; Expected date: 05/01/2025  -     CBC With Differential With Platelet; Future; Expected date: 05/01/2025  -     Comp Metabolic Panel (14); Future; Expected date: 05/01/2025  -     Lipid Panel; Future; Expected date: 05/01/2025  -     Vitamin D; Future; Expected date: 05/01/2025  2. Essential hypertension  -     Hemoglobin A1C; Future; Expected date: 05/01/2025  -     CBC With Differential With Platelet; Future; Expected date: 05/01/2025  -     Comp Metabolic Panel (14); Future; Expected date: 05/01/2025  -     Lipid Panel; Future; Expected date: 05/01/2025  3. Dyslipidemia  -     Hemoglobin A1C; Future; Expected date: 05/01/2025  -     CBC With Differential With Platelet; Future; Expected date: 05/01/2025  -     Comp Metabolic Panel (14); Future; Expected date: 05/01/2025  -     Lipid Panel; Future; Expected date: 05/01/2025  4. Type 2 diabetes mellitus without complication, without long-term current use of insulin (HCC)  -     Hemoglobin A1C; Future; Expected date: 05/01/2025  -     Microalb/Creat Ratio, Random Urine; Future; Expected date: 05/01/2025  -     OPHTHALMOLOGY - INTERNAL  -     CBC With Differential With Platelet; Future; Expected date: 05/01/2025  -      Comp Metabolic Panel (14); Future; Expected date: 05/01/2025  -     Lipid Panel; Future; Expected date: 05/01/2025  5. CLL (chronic lymphocytic leukemia) (HCC)  6. Vitamin D deficiency  -     Vitamin D; Future; Expected date: 05/01/2025  7. Erectile dysfunction, unspecified erectile dysfunction type  8. Abnormal CT of the abdomen  9. Former smoker  10. Abnormal ultrasound of pelvis  11. Chronic fatigue  12. Chronic insomnia  [unfilled]              -Chronic insomnia: Trial increased dose (50mg) of hydroxyzine.   -Chronic fatigue: Plan as above. Due for f/u with Dr. Palacio as well. F/u labs.   -CLL & Abnormal CT of abdomen: Stable. Sees Dr. Palacio.   -Abnormal ultrasound of pelvis: Sees urology. Never completed recommended labs and will complete today.   -HTN & HLD: Well-controlled. CPM.  -DM: F/u A1c. Consider decreasing & discontinuing metformin if A1c remains very low. Overdue for ophtho exam.   -Vit D defic: Check level.  -ED: No current concerns.   -Annual labs ordered.     The patient indicates understanding of these issues and agrees to the plan.        No follow-ups on file.     Khris Fountain DO, 5/1/2025     Supplementary Documentation:   General Health:  In the past six months, have you lost more than 10 pounds without trying?: (Patient-Rptd) 2 - No  Has your appetite been poor?: (Patient-Rptd) Yes  Type of Diet: (Patient-Rptd) Balanced, Other  How does the patient maintain a good energy level?: (Patient-Rptd) Daily Walks  How would you describe your daily physical activity?: (Patient-Rptd) Light  How would you describe your current health state?: (Patient-Rptd) Good  How do you maintain positive mental well-being?: (Patient-Rptd) Social Interaction, Visiting Friends  On a scale of 0 to 10, with 0 being no pain and 10 being severe pain, what is your pain level?: (Patient-Rptd) 3 - (Mild)  In the past six months, have you experienced urine leakage?: (Patient-Rptd) 0-No  At any time do you feel  concerned for the safety/well-being of yourself and/or your children, in your home or elsewhere?: (Patient-Rptd) No  Have you had any immunizations at another office such as Influenza, Hepatitis B, Tetanus, or Pneumococcal?: (Patient-Rptd) No    Health Maintenance   Topic Date Due    COVID-19 Vaccine ( season) 2024    Annual Well Visit  2025    Annual Depression Screening  2025    Diabetes Care: Foot Exam (Annual)  Never done    Diabetes Care: Microalb/Creat Ratio (Annual)  2025    Diabetes Care Dilated Eye Exam  2025    HTN: BP Follow-Up  2025    Diabetes Care A1C  2025    Influenza Vaccine (Season Ended) 10/01/2025    Diabetes Care: GFR  10/11/2025    PSA  10/11/2026    Colorectal Cancer Screening  2027    Fall Risk Screening (Annual)  Completed    Pneumococcal Vaccine: 50+ Years  Completed    Zoster Vaccines  Completed    Meningococcal B Vaccine  Aged Out          [1]   Social History  Tobacco Use   Smoking Status Former    Current packs/day: 0.00    Average packs/day: 0.8 packs/day for 20.0 years (15.0 ttl pk-yrs)    Types: Cigarettes    Start date: 1980    Quit date: 2000    Years since quittin.3   Smokeless Tobacco Never   Tobacco Comments    Stop smoking

## 2025-06-02 RX ORDER — AMLODIPINE BESYLATE 5 MG/1
5 TABLET ORAL DAILY
Qty: 90 TABLET | Refills: 3 | Status: SHIPPED | OUTPATIENT
Start: 2025-06-02

## 2025-06-02 NOTE — TELEPHONE ENCOUNTER
Refill passed per [facility] protocols.    Requested Prescriptions   Pending Prescriptions Disp Refills    AMLODIPINE 5 MG Oral Tab [Pharmacy Med Name: AMLODIPINE BESYLATE 5 MG TAB] 90 tablet 3     Sig: Take 1 tablet (5 mg total) by mouth daily.       Hypertension Medications Protocol Passed - 6/2/2025 10:49 AM

## 2025-06-24 ENCOUNTER — HOSPITAL ENCOUNTER (OUTPATIENT)
Dept: ULTRASOUND IMAGING | Age: 70
Discharge: HOME | End: 2025-06-24
Attending: UROLOGY
Payer: MEDICARE

## 2025-06-24 DIAGNOSIS — R93.812 ABNORMAL FINDING ON DIAGNOSTIC IMAGING OF LEFT TESTICLE: ICD-10-CM

## 2025-06-24 PROCEDURE — 93975 VASCULAR STUDY: CPT | Performed by: UROLOGY

## 2025-06-24 PROCEDURE — 76870 US EXAM SCROTUM: CPT | Performed by: UROLOGY

## 2025-07-01 ENCOUNTER — OFFICE VISIT (OUTPATIENT)
Age: 70
End: 2025-07-01
Attending: INTERNAL MEDICINE
Payer: MEDICARE

## 2025-07-01 ENCOUNTER — LAB ENCOUNTER (OUTPATIENT)
Dept: LAB | Facility: HOSPITAL | Age: 70
End: 2025-07-01
Attending: INTERNAL MEDICINE
Payer: MEDICARE

## 2025-07-01 VITALS
OXYGEN SATURATION: 99 % | BODY MASS INDEX: 26.61 KG/M2 | RESPIRATION RATE: 16 BRPM | TEMPERATURE: 97 F | HEART RATE: 109 BPM | WEIGHT: 214 LBS | SYSTOLIC BLOOD PRESSURE: 146 MMHG | HEIGHT: 75 IN | DIASTOLIC BLOOD PRESSURE: 82 MMHG

## 2025-07-01 DIAGNOSIS — R93.812 ABNORMAL FINDING ON DIAGNOSTIC IMAGING OF LEFT TESTICLE: ICD-10-CM

## 2025-07-01 DIAGNOSIS — Z87.39 HISTORY OF GOUT: ICD-10-CM

## 2025-07-01 DIAGNOSIS — C91.10 CLL (CHRONIC LYMPHOCYTIC LEUKEMIA) (HCC): ICD-10-CM

## 2025-07-01 DIAGNOSIS — C91.10 CLL (CHRONIC LYMPHOCYTIC LEUKEMIA) (HCC): Primary | ICD-10-CM

## 2025-07-01 LAB
AFP-TM SERPL-MCNC: 11.8 NG/ML (ref ?–8)
ALBUMIN SERPL-MCNC: 4.6 G/DL (ref 3.2–4.8)
ALBUMIN/GLOB SERPL: 1.7 {RATIO} (ref 1–2)
ALP LIVER SERPL-CCNC: 52 U/L (ref 45–117)
ALT SERPL-CCNC: 19 U/L (ref 10–49)
ANION GAP SERPL CALC-SCNC: 10 MMOL/L (ref 0–18)
AST SERPL-CCNC: 39 U/L (ref ?–34)
BASOPHILS # BLD AUTO: 0.05 X10(3) UL (ref 0–0.2)
BASOPHILS NFR BLD AUTO: 0.3 %
BILIRUB SERPL-MCNC: 0.9 MG/DL (ref 0.2–1.1)
BUN BLD-MCNC: 13 MG/DL (ref 9–23)
BUN/CREAT SERPL: 13.8 (ref 10–20)
CALCIUM BLD-MCNC: 8.9 MG/DL (ref 8.7–10.4)
CHLORIDE SERPL-SCNC: 104 MMOL/L (ref 98–112)
CO2 SERPL-SCNC: 28 MMOL/L (ref 21–32)
CREAT BLD-MCNC: 0.94 MG/DL (ref 0.7–1.3)
DEPRECATED RDW RBC AUTO: 55.3 FL (ref 35.1–46.3)
EGFRCR SERPLBLD CKD-EPI 2021: 87 ML/MIN/1.73M2 (ref 60–?)
EOSINOPHIL # BLD AUTO: 0.12 X10(3) UL (ref 0–0.7)
EOSINOPHIL NFR BLD AUTO: 0.8 %
ERYTHROCYTE [DISTWIDTH] IN BLOOD BY AUTOMATED COUNT: 16.1 % (ref 11–15)
FASTING STATUS PATIENT QL REPORTED: NO
GLOBULIN PLAS-MCNC: 2.7 G/DL (ref 2–3.5)
GLUCOSE BLD-MCNC: 105 MG/DL (ref 70–99)
HCT VFR BLD AUTO: 34.9 % (ref 39–53)
HGB BLD-MCNC: 12.1 G/DL (ref 13–17.5)
IMM GRANULOCYTES # BLD AUTO: 0.01 X10(3) UL (ref 0–1)
IMM GRANULOCYTES NFR BLD: 0.1 %
LDH SERPL L TO P-CCNC: 152 U/L (ref 120–246)
LYMPHOCYTES # BLD AUTO: 12.88 X10(3) UL (ref 1–4)
LYMPHOCYTES NFR BLD AUTO: 88.9 %
MCH RBC QN AUTO: 33 PG (ref 26–34)
MCHC RBC AUTO-ENTMCNC: 34.7 G/DL (ref 31–37)
MCV RBC AUTO: 95.1 FL (ref 80–100)
MONOCYTES # BLD AUTO: 0.44 X10(3) UL (ref 0.1–1)
MONOCYTES NFR BLD AUTO: 3 %
NEUTROPHILS # BLD AUTO: 0.99 X10 (3) UL (ref 1.5–7.7)
NEUTROPHILS # BLD AUTO: 0.99 X10(3) UL (ref 1.5–7.7)
NEUTROPHILS NFR BLD AUTO: 6.9 %
OSMOLALITY SERPL CALC.SUM OF ELEC: 294 MOSM/KG (ref 275–295)
PLATELET # BLD AUTO: 149 10(3)UL (ref 150–450)
POTASSIUM SERPL-SCNC: 4 MMOL/L (ref 3.5–5.1)
PROT SERPL-MCNC: 7.3 G/DL (ref 5.7–8.2)
RBC # BLD AUTO: 3.67 X10(6)UL (ref 3.8–5.8)
SODIUM SERPL-SCNC: 142 MMOL/L (ref 136–145)
WBC # BLD AUTO: 14.5 X10(3) UL (ref 4–11)

## 2025-07-01 PROCEDURE — 83615 LACTATE (LD) (LDH) ENZYME: CPT

## 2025-07-01 PROCEDURE — 36415 COLL VENOUS BLD VENIPUNCTURE: CPT

## 2025-07-01 PROCEDURE — 85025 COMPLETE CBC W/AUTO DIFF WBC: CPT

## 2025-07-01 PROCEDURE — 82105 ALPHA-FETOPROTEIN SERUM: CPT

## 2025-07-01 PROCEDURE — 84704 HCG FREE BETACHAIN TEST: CPT

## 2025-07-01 PROCEDURE — 80053 COMPREHEN METABOLIC PANEL: CPT

## 2025-07-01 NOTE — PROGRESS NOTES
Cancer Center Progress Note    Patient Name: Johann Milner   YOB: 1955   Medical Record Number: [id number]     Chief Complaint:  CLL     Oncology History:  70 year old seen prior history of CLL (2017) with normal WBC, lost to follow up, presenting with elevated WBC and ALC consistent with CLL, Normal FISH    7/1/2025  Here for follow up of CLL. Johann reports feeling well w/o systemic signs of illness. 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 He mentions his appetite fluctuates along with early satiety occasionally. Pt denies bleeding, chest pain, dyspnea, n/v/abd pain other concerns on ROS.    PMH: HTN, DM  Family History: three sisters with breast cancer. father with bone cancer.   Social History: lives with wife, 3 kids. Quit> 20 years. retired from  at     Current Outpatient Medications:     amLODIPine 5 MG Oral Tab, Take 1 tablet (5 mg total) by mouth daily., Disp: 90 tablet, Rfl: 3    simvastatin 40 MG Oral Tab, Take 1 tablet (40 mg total) by mouth nightly., Disp: 90 tablet, Rfl: 3    lisinopril 30 MG Oral Tab, Take 1 tablet (30 mg total) by mouth daily., Disp: 100 tablet, Rfl: 2    hydrOXYzine 25 MG Oral Tab, Take 1 tablet (25 mg total) by mouth nightly as needed., Disp: 60 tablet, Rfl: 0    ergocalciferol 1.25 MG (38032 UT) Oral Cap, Take 1 capsule (50,000 Units total) by mouth once a week., Disp: 15 capsule, Rfl: 2    hydrocortisone (PROCTOSOL HC) 2.5 % External Cream, Place 1 Application rectally 2 (two) times daily., Disp: 1 each, Rfl: 3    No Known Allergies     Review of Systems: Oncology specific ROS negative except as per HPI    /82 (BP Location: Left arm, Patient Position: Sitting, Cuff Size: adult)   Pulse 109   Temp 97.3 °F (36.3 °C) (Tympanic)   Resp 16   Ht 1.905 m (6' 3\")   Wt 97.1 kg (214 lb)   SpO2 99%   BMI 26.75 kg/m²   Wt Readings from Last 6 Encounters:   07/01/25 97.1 kg (214 lb)   05/01/25 99.3 kg (219 lb)   10/31/24 99.3 kg (218 lb 14.4 oz)    10/11/24 99.3 kg (219 lb)   08/21/24 100.2 kg (221 lb)   06/14/24 98.7 kg (217 lb 8 oz)   General: Patient is alert and oriented x 3, not in acute distress.  HEENT: EOMs intact.  Non icterus, no palor  Neck: ROM intact. No adenopathy  Chest: Clear to lungs bilaterally  Abdomen: Soft, non tender. ND; no appreciable hepatosplenomegaly  Extremities: No edema, cyanosis, or bruising  Neurological: motor strength grossly intact, MA4E  Psych: appropriate mood and affect      Imaging:  MRI abdominal imaging from 7/2024 reviewed    Impression and Plan:    1.) CLL, stage 0, FISH negative, low risk    -- Patient has no systemic signs of illness.  Less concern for malignant potential from this gastrohepatic lymph node based on SUV of only 4.14 and liver being 4.16.    --CT scan reviewed and PET scan reviewed at tumor board from 5/2024  -- pt was rec for MRI abdomen for further evaluation of this gastrohepatic lymph node; discussed with radiology in July and this is felt to be a normal variation of the caudate lobe of liver, informed pt today  --hgb and plts are improved w/o systemic signs of illness, so continue to monitor  -- f/u in 6 mo to see if there are indications for tx of his CLL    2.) History of gout- ok to use allopurinol for gout txt per pcp if indicated, does not need it for CLL    MDM: Moderate    Jhon Palacio MD  [facility]  [address]. [facility], [place] [address]

## 2025-07-02 ENCOUNTER — PATIENT MESSAGE (OUTPATIENT)
Dept: SURGERY | Facility: CLINIC | Age: 70
End: 2025-07-02

## 2025-07-02 LAB — HCG BETA SUBUNIT TUMOR MARKER QN: <1 MIU/ML

## 2025-07-07 ENCOUNTER — PATIENT MESSAGE (OUTPATIENT)
Dept: SURGERY | Facility: CLINIC | Age: 70
End: 2025-07-07

## 2025-07-07 DIAGNOSIS — R93.812 ABNORMAL FINDING ON DIAGNOSTIC IMAGING OF LEFT TESTICLE: Primary | ICD-10-CM

## 2025-07-07 NOTE — TELEPHONE ENCOUNTER
Can we offer this patient an appointment with one of the physicians in about 8 weeks to discuss his scrotal US and lab results?    Future Appointments   Date Time Provider Department Center   1/5/2026 11:00 AM Jhon Palacio MD [facility]

## 2025-07-07 NOTE — TELEPHONE ENCOUNTER
----- Message from Jhon Palacio sent at 7/7/2025 12:42 PM CDT -----  Walt Hernandez,    This pt should likely undergo a repeat ultrasound in 6-8 wks and see Cj or Rah in Urology for follow up.     Dr. Hakeem Levine  ----- Message -----  From: Mary Khan, APRN  Sent: 7/2/2025  12:46 PM CDT  To: Jhon Palacio MD    This patient had a follow-up scrotal US on 06/24 that was not concerning per Dr Venegas. [facility] sent    Future Appointments   Date Time Provider Department Center   1/5/2026 11:00 AM Jhon Palacio MD [facility]       ----- Message -----  From: Lab, Background User  Sent: 7/1/2025  11:40 AM CDT  To: Sunny Venegas MD

## 2025-07-18 NOTE — TELEPHONE ENCOUNTER
This patient has his ultrasound scheduled for 09/02/25. Can we offer him an appointment for a week or so after that with one of the physicians to discuss the results? Thanks

## 2025-07-21 ENCOUNTER — APPOINTMENT (OUTPATIENT)
Dept: GENERAL RADIOLOGY | Age: 70
End: 2025-07-21
Attending: NURSE PRACTITIONER
Payer: MEDICARE

## 2025-07-21 ENCOUNTER — HOSPITAL ENCOUNTER (OUTPATIENT)
Age: 70
Discharge: HOME | End: 2025-07-21
Payer: MEDICARE

## 2025-07-21 VITALS
HEART RATE: 92 BPM | TEMPERATURE: 99 F | SYSTOLIC BLOOD PRESSURE: 132 MMHG | RESPIRATION RATE: 16 BRPM | DIASTOLIC BLOOD PRESSURE: 73 MMHG | OXYGEN SATURATION: 100 %

## 2025-07-21 DIAGNOSIS — S16.1XXA ACUTE CERVICAL MYOFASCIAL STRAIN, INITIAL ENCOUNTER: Primary | ICD-10-CM
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
## 2025-07-21 DIAGNOSIS — M54.50 ACUTE BILATERAL LOW BACK PAIN WITHOUT SCIATICA: ICD-10-CM

## 2025-07-21 PROCEDURE — 72100 X-RAY EXAM L-S SPINE 2/3 VWS: CPT | Performed by: NURSE PRACTITIONER

## 2025-07-21 PROCEDURE — 72072 X-RAY EXAM THORAC SPINE 3VWS: CPT | Performed by: NURSE PRACTITIONER

## 2025-07-21 PROCEDURE — 99214 OFFICE O/P EST MOD 30 MIN: CPT | Performed by: NURSE PRACTITIONER

## 2025-07-21 PROCEDURE — 72040 X-RAY EXAM NECK SPINE 2-3 VW: CPT | Performed by: NURSE PRACTITIONER

## 2025-07-21 RX ORDER — LIDOCAINE 4 G/G
3 PATCH TOPICAL EVERY 24 HOURS
Qty: 21 PATCH | Refills: 0 | Status: SHIPPED | OUTPATIENT
Start: 2025-07-21 | End: 2025-07-28

## 2025-07-21 RX ORDER — NAPROXEN 500 MG/1
500 TABLET ORAL 2 TIMES DAILY PRN
Qty: 20 TABLET | Refills: 0 | Status: SHIPPED | OUTPATIENT
Start: 2025-07-21 | End: 2025-07-31
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
## 2025-07-21 NOTE — TELEPHONE ENCOUNTER
- s/w pt; pt identity verified with name and   - pt scheduled for f/u appt on 25 @ 1:15pm  - encounter complete
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
## (undated) DIAGNOSIS — D72.829 LEUKOCYTOSIS, UNSPECIFIED TYPE: Primary | ICD-10-CM

## (undated) DEVICE — MEDI-VAC NON-CONDUCTIVE SUCTION TUBING 6MM X 1.8M (6FT.) L: Brand: CARDINAL HEALTH

## (undated) DEVICE — KIT CLEAN ENDOKIT 1.1OZ GOWNX2

## (undated) DEVICE — CONMED SCOPE SAVER BITE BLOCK, 20X27 MM: Brand: SCOPE SAVER

## (undated) DEVICE — LINE MNTR ADLT SET O2 INTMD

## (undated) DEVICE — SNARE OPTMZ PLPCTM TRP

## (undated) DEVICE — KIT ENDO ORCAPOD 160/180/190

## (undated) DEVICE — MEDI-VAC NON-CONDUCTIVE SUCTION TUBING: Brand: CARDINAL HEALTH

## (undated) DEVICE — 60 ML SYRINGE REGULAR TIP: Brand: MONOJECT

## (undated) DEVICE — STERILE LATEX POWDER-FREE SURGICAL GLOVESWITH NITRILE COATING: Brand: PROTEXIS

## (undated) DEVICE — Device: Brand: DUAL NARE NASAL CANNULAE FEMALE LUER CON 7FT O2 TUBE

## (undated) DEVICE — 35 ML SYRINGE REGULAR TIP: Brand: MONOJECT

## (undated) DEVICE — BALLOON HEMOSTATIC EUS LINEAR

## (undated) DEVICE — YANKAUER,BULB TIP,W/O VENT,RIGID,STERILE: Brand: MEDLINE

## (undated) DEVICE — BALLOON HEMOSTATIC EUS RADIAL

## (undated) DEVICE — HEXAGONAL CAPTIVATOR STIFF 13M

## (undated) NOTE — LETTER
[facility] [address] , [place]   Date:   5/30/2023     Name:   Chun Mcnamara    YOB: 1955   MRN:   [id number]       WHERE IS YOUR PAIN NOW? Alden the areas on your body where you feel the described sensations. Use the appropriate symbol. Yomaira Adkins the areas of radiation. Include all affected areas. Just to complete the picture, please draw in the face. ACHE:  ^ ^ ^   NUMBNESS:  0000   PINS & NEEDLES:  = = = =                              ^ ^ ^                       0000              = = = =                                    ^ ^ ^                       0000            = = = =      BURNING:  XXXX   STABBING: ////                  XXXX                ////                         XXXX          ////     Please alden the line below indicating your degree of pain right now  with 0 being no pain 10 being the worst pain possible.                                          0             1             2              3             4              5              6              7             8             9             10         Patient Signature:

## (undated) NOTE — LETTER
Date: 3/6/2020    Patient Name: Renetta Gregg          To Whom it may concern,    My patient requires more sleep. The room he sleeps and needs to be dark, quiet and cool.       Anything you can do to help with this environment that is conducive to sleep will g

## (undated) NOTE — LETTER
[facility]  [address], [place], [place]    Authorization for Surgical Operation and Procedure                               I hereby authorize Khris Moy MD, my physician and his/her assistants (if applicable), which may include medical students, residents, and/or fellows, to perform the following surgical operation/ procedure and administer such anesthesia as may be determined necessary by my physician: Operation/Procedure name (s) ESOPHAGOGASTRODUODENOSCOPY / ENDOSCOPIC ULTRASOUND with Fine Needle Aspiration on Johann Milner   2.   I recognize that during the surgical operation/procedure, unforeseen conditions may necessitate additional or different procedures than those listed above.  I, therefore, further authorize and request that the above-named surgeon, assistants, or designees perform such procedures as are, in their judgment, necessary and desirable.    3.   My surgeon/physician has discussed prior to my surgery the potential benefits, risks and side effects of this procedure; the likelihood of achieving goals; and potential problems that might occur during recuperation.  They also discussed reasonable alternatives to the procedure, including risks, benefits, and side effects related to the alternatives and risks related to not receiving this procedure.  I have had all my questions answered and I acknowledge that no guarantee has been made as to the result that may be obtained.    4.   Should the need arise during my operation/procedure, which includes change of level of care prior to discharge, I also consent to the administration of blood and/or blood products.  Further, I understand that despite careful testing and screening of blood or blood products by collecting agencies, I may still be subject to ill effects as a result of receiving a blood transfusion and/or blood products.  The following are some, but not all, of the potential risks that can occur: fever and  allergic reactions, hemolytic reactions, transmission of diseases such as Hepatitis, AIDS and Cytomegalovirus (CMV) and fluid overload.  In the event that I wish to have an autologous transfusion of my own blood, or a directed donor transfusion, I will discuss this with my physician.  Check only if Refusing Blood or Blood Products  I understand refusal of blood or blood products as deemed necessary by my physician may have serious consequences to my condition to include possible death. I hereby assume responsibility for my refusal and release the hospital, its personnel, and my physicians from any responsibility for the consequences of my refusal.    o  Refuse   5.   I authorize the use of any specimen, organs, tissues, body parts or foreign objects that may be removed from my body during the operation/procedure for diagnosis, research or teaching purposes and their subsequent disposal by hospital authorities.  I also authorize the release of specimen test results and/or written reports to my treating physician on the hospital medical staff or other referring or consulting physicians involved in my care, at the discretion of the Pathologist or my treating physician.    6.   I consent to the photographing or videotaping of the operations or procedures to be performed, including appropriate portions of my body for medical, scientific, or educational purposes, provided my identity is not revealed by the pictures or by descriptive texts accompanying them.  If the procedure has been photographed/videotaped, the surgeon will obtain the original picture, image, videotape or CD.  The hospital will not be responsible for storage, release or maintenance of the picture, image, tape or CD.    7.   I consent to the presence of a  or observers in the operating room as deemed necessary by my physician or their designees.    8.   I recognize that in the event my procedure results in extended X-Ray/fluoroscopy  time, I may develop a skin reaction.    9. If I have a Do Not Attempt Resuscitation (DNAR) order in place, that status will be suspended while in the operating room, procedural suite, and during the recovery period unless otherwise explicitly stated by me (or a person authorized to consent on my behalf). The surgeon or my attending physician will determine when the applicable recovery period ends for purposes of reinstating the DNAR order.  10. Patients having a sterilization procedure: I understand that if the procedure is successful the results will be permanent and it will therefore be impossible for me to inseminate, conceive, or bear children.  I also understand that the procedure is intended to result in sterility, although the result has not been guaranteed.   11. I acknowledge that my physician has explained sedation/analgesia administration to me including the risk and benefits I consent to the administration of sedation/analgesia as may be necessary or desirable in the judgment of my physician.    I CERTIFY THAT I HAVE READ AND FULLY UNDERSTAND THE ABOVE CONSENT TO OPERATION and/or OTHER PROCEDURE.     ____________________________________  _________________________________        ______________________________  Signature of Patient    Signature of Responsible Person                Printed Name of Responsible Person                                      ____________________________________  _____________________________                ________________________________  Signature of Witness        Date  Time         Relationship to Patient    STATEMENT OF PHYSICIAN My signature below affirms that prior to the time of the procedure; I have explained to the patient and/or his/her legal representative, the risks and benefits involved in the proposed treatment and any reasonable alternative to the proposed treatment. I have also explained the risks and benefits involved in refusal of the proposed treatment and  alternatives to the proposed treatment and have answered the patient's questions. If I have a significant financial interest in a co-management agreement or a significant financial interest in any product or implant, or other significant relationship used in this procedure/surgery, I have disclosed this and had a discussion with my patient.     _____________________________________________________              _____________________________  (Signature of Physician)                                                                                         (Date)                                   (Time)  Patient Name: Johann Milner      : 2/3/1955      Printed: 2024     Medical Record #: [id number]                                      Page 1 of 1

## (undated) NOTE — LETTER
[facility]  Administration of Anesthesia  I, Johann Milner agree to be cared for by a physician anesthesiologist alone and/or with a nurse anesthetist, who is specially trained to monitor me and give me medicine to put me to sleep or keep me comfortable during my procedure    I understand that my anesthesiologist and/or anesthetist is not an employee or agent of [facility] or [employer]. He or she works for [facility]    As the patient asking for anesthesia services, I agree to:  Allow the anesthesiologist (anesthesia doctor) to give me medicine and do additional procedures as necessary. Some examples are: Starting or using an “IV” to give me medicine, fluids or blood during my procedure, and having a breathing tube placed to help me breathe when I’m asleep (intubation). In the event that my heart stops working properly, I understand that my anesthesiologist will make every effort to sustain my life, unless otherwise directed by [facility] Do Not Resuscitate documents.  Tell my anesthesia doctor before my procedure:  If I am pregnant.  The last time that I ate or drank.  iii. All of the medicines I take (including prescriptions, herbal supplements, and pills I can buy without a prescription (including street drugs/illegal medications). Failure to inform my anesthesiologist about these medicines may increase my risk of anesthetic complications.  iv.If I am allergic to anything or have had a reaction to anesthesia before.  I understand how the anesthesia medicine will help me (benefits).  I understand that with any type of anesthesia medicine there are risks:  The most common risks are: nausea, vomiting, sore throat, muscle soreness, damage to my eyes, mouth, or teeth (from breathing tube placement).  Rare risks include: remembering what happened during my procedure, allergic reactions to medications, injury to my airway, heart, lungs, vision, nerves, or  muscles and in extremely rare instances death.  My doctor has explained to me other choices available to me for my care (alternatives).  Pregnant Patients (“epidural”):  I understand that the risks of having an epidural (medicine given into my back to help control pain during labor), include itching, low blood pressure, difficulty urinating, headache or slowing of the baby’s heart. Very rare risks include infection, bleeding, seizure, irregular heart rhythms and nerve injury.  Regional Anesthesia (“spinal”, “epidural”, & “nerve blocks”):  I understand that rare but potential complications include headache, bleeding, infection, seizure, irregular heart rhythms, and nerve injury.    _____________________________________________________________________________  Patient (or Representative) Signature/Relationship to Patient  Date   Time    _____________________________________________________________________________   Name (if used)    Language/Organization   Time    _____________________________________________________________________________  Nurse Anesthetist Signature     Date   Time  _____________________________________________________________________________  Anesthesiologist Signature     Date   Time  I have discussed the procedure and information above with the patient (or patient’s representative) and answered their questions. The patient or their representative has agreed to have anesthesia services.    _____________________________________________________________________________  Witness        Date   Time  I have verified that the signature is that of the patient or patient’s representative, and that it was signed before the procedure  Patient Name: Johann Milner     : 2/3/1955                 Printed: 2024 at 9:31 AM    Medical Record #: [id number]                                            Page 1 of 1  ----------ANESTHESIA CONSENT----------

## (undated) NOTE — LETTER
AUTHORIZATION FOR SURGICAL OPERATION OR OTHER PROCEDURE    1. I hereby authorize Dr. Caridad Santoro and the [facility] staff assigned to my case to perform the following operation and/or procedure at the [facility]:    __Left first dorsal compartment steroid injection, ultrasound guidance    2. My physician has explained the nature and purpose of the operation or other procedure, possible alternative methods of treatment, the risks involved, and the possibility of complication to me. I acknowledge that no guarantee has been made as to the result that may be obtained. 3.  I recognize that, during the course of this operation, or other procedure, unforseen conditions may necessitate additional or different procedure than those listed above. I, therefore, further authorize and request that the above named physician, his/her physician assistants or designees perform such procedures as are, in his/her professional opinion, necessary and desirable. 4.  Any tissue or organs removed in the operation or other procedure may be disposed of by and at the discretion of the [facility] staff and [facility] [place]. 5.  I understand that in the event of a medical emergency, I will be transported by local paramedics to [facility] or other \[facility] [place] [facility]\"" emergency department. 6.  I certify that I have read and fully understand the above consent to operation and/or other procedure. 7.  I acknowledge that my physician has explained sedation/analgesia administration to me including the risks and benefits. I consent to the administration of sedation/analgesia as may be necessary or desirable in the judgement of my physician. Witness signature: ___________________________________________________ Date:  ______/______/_____                    Time:  ________ A. M.  P.M.        Patient Name:    Ritu Sears  2/3/1955  [id number]         Patient signature: ___________________________________________________             Relationship to Patient:    Statement of Physician  My signature below affirms that prior to the time of the procedure, I have explained to the patient and/or his/her guardian, the risks and benefits involved in the proposed treatment and any reasonable alternative to the proposed treatment. I have also explained the risks and benefits involved in the refusal of the proposed treatment and have answered the patient's questions.                         Date:  ______/______/_______  Provider                      Signature:  __________________________________________________________       Time:  ___________ ATYESHA    P.M.